# Patient Record
Sex: FEMALE | Race: WHITE | NOT HISPANIC OR LATINO | Employment: OTHER | URBAN - NONMETROPOLITAN AREA
[De-identification: names, ages, dates, MRNs, and addresses within clinical notes are randomized per-mention and may not be internally consistent; named-entity substitution may affect disease eponyms.]

---

## 2017-03-10 ENCOUNTER — TELEPHONE (OUTPATIENT)
Dept: OBSTETRICS AND GYNECOLOGY | Facility: CLINIC | Age: 67
End: 2017-03-10

## 2017-03-10 NOTE — TELEPHONE ENCOUNTER
Pt never got labs drawn at her last visit. Does she still need to come in for them or can they now be canceled?

## 2018-05-31 ENCOUNTER — TELEPHONE (OUTPATIENT)
Dept: OBSTETRICS AND GYNECOLOGY | Facility: CLINIC | Age: 68
End: 2018-05-31

## 2018-05-31 NOTE — TELEPHONE ENCOUNTER
Pt has appt next Thursday and was asking if she could get started on Premarin cream before her appt. I let her know we would need to wait until that appt so zenon can assess and discuss with her pt understood

## 2018-06-14 ENCOUNTER — OFFICE VISIT (OUTPATIENT)
Dept: OBSTETRICS AND GYNECOLOGY | Facility: CLINIC | Age: 68
End: 2018-06-14

## 2018-06-14 VITALS
WEIGHT: 186 LBS | HEIGHT: 64 IN | DIASTOLIC BLOOD PRESSURE: 80 MMHG | BODY MASS INDEX: 31.76 KG/M2 | SYSTOLIC BLOOD PRESSURE: 140 MMHG

## 2018-06-14 DIAGNOSIS — R30.0 DYSURIA: ICD-10-CM

## 2018-06-14 DIAGNOSIS — Z12.31 ENCOUNTER FOR SCREENING MAMMOGRAM FOR MALIGNANT NEOPLASM OF BREAST: ICD-10-CM

## 2018-06-14 DIAGNOSIS — Z01.419 WELL WOMAN EXAM WITH ROUTINE GYNECOLOGICAL EXAM: Primary | ICD-10-CM

## 2018-06-14 DIAGNOSIS — Z12.11 SCREENING FOR COLON CANCER: ICD-10-CM

## 2018-06-14 DIAGNOSIS — N95.1 MENOPAUSAL STATE: ICD-10-CM

## 2018-06-14 DIAGNOSIS — N89.8 VAGINAL DRYNESS: ICD-10-CM

## 2018-06-14 PROCEDURE — G0101 CA SCREEN;PELVIC/BREAST EXAM: HCPCS | Performed by: NURSE PRACTITIONER

## 2018-06-14 RX ORDER — NITROFURANTOIN 25; 75 MG/1; MG/1
100 CAPSULE ORAL 2 TIMES DAILY
Qty: 14 CAPSULE | Refills: 0 | Status: SHIPPED | OUTPATIENT
Start: 2018-06-14 | End: 2018-06-21

## 2018-06-14 RX ORDER — PHENAZOPYRIDINE HYDROCHLORIDE 200 MG/1
TABLET, FILM COATED ORAL
Qty: 9 TABLET | Refills: 1 | Status: ON HOLD | OUTPATIENT
Start: 2018-06-14 | End: 2021-04-09

## 2018-06-14 NOTE — PROGRESS NOTES
Deanna Tsai is a 68 y.o.      Chief Complaint   Patient presents with   • Gynecologic Exam     I am here for a yearly exam.  I am due for a donte.  I think I would like to go back on Premarin.             HPI - Patient is in for annual exam.    She had a MILA SADIE for fibroids.  She has no hot flashes, she used to take HRT.  Her mother had breast cancer in her 40's.  She has some dysuria and urge incontinence.  Her DAISY is mild.  She c/o vaginal dryness.  She is due for a colonoscopy.            The following portions of the patient's history were reviewed and updated as appropriate:vital signs, allergies, current medications, past family history, past medical history, past social history, past surgical history and problem list.    No current outpatient prescriptions on file.    Review of Systems   Constitutional: Negative for activity change, appetite change, fatigue and fever.   HENT: Negative for ear pain, hearing loss, sore throat and trouble swallowing.    Eyes: Negative for pain, discharge and visual disturbance.   Respiratory: Negative for apnea, cough, chest tightness and shortness of breath.    Cardiovascular: Negative for chest pain and palpitations.   Gastrointestinal: Negative for abdominal distention, abdominal pain, blood in stool, constipation, diarrhea, nausea and vomiting.   Endocrine: Negative for heat intolerance, polydipsia and polyuria.   Genitourinary: Positive for dysuria and urgency. Negative for difficulty urinating, dyspareunia, frequency, menstrual problem, pelvic pain, vaginal bleeding, vaginal discharge and vaginal pain.        Vaginal dryness     Musculoskeletal: Positive for arthralgias. Negative for back pain, joint swelling and myalgias.   Skin: Negative for rash and wound.   Allergic/Immunologic: Negative for environmental allergies and immunocompromised state.   Neurological: Negative for dizziness, tremors, seizures, numbness and headaches.   Hematological: Negative  "for adenopathy. Does not bruise/bleed easily.   Psychiatric/Behavioral: Negative for agitation, confusion and sleep disturbance. The patient is not nervous/anxious.      Breast ROS: negative    Objective      /80 (BP Location: Right arm, Patient Position: Sitting, Cuff Size: Adult)   Ht 162.6 cm (64\")   Wt 84.4 kg (186 lb)   BMI 31.93 kg/m²       Physical Exam   Constitutional: She is oriented to person, place, and time. She appears well-developed and well-nourished.   HENT:   Head: Normocephalic and atraumatic.   Right Ear: External ear normal.   Left Ear: External ear normal.   Eyes: Conjunctivae and EOM are normal. Right eye exhibits no discharge. Left eye exhibits no discharge. No scleral icterus.   Neck: Normal range of motion. Neck supple. Carotid bruit is not present. No thyromegaly present.   Cardiovascular: Regular rhythm and normal heart sounds.    No murmur heard.  Pulmonary/Chest: Effort normal and breath sounds normal. No respiratory distress. Right breast exhibits no inverted nipple, no mass, no nipple discharge, no skin change and no tenderness. Left breast exhibits no inverted nipple, no mass, no nipple discharge, no skin change and no tenderness. Breasts are symmetrical. There is no breast swelling.   Abdominal: Soft. Bowel sounds are normal. She exhibits no distension and no mass. There is no tenderness. There is no guarding. No hernia. Hernia confirmed negative in the right inguinal area and confirmed negative in the left inguinal area.   Genitourinary: Vagina normal. Rectal exam shows no mass. No breast tenderness, discharge or bleeding. There is no rash, tenderness, lesion or injury on the right labia. There is no rash, tenderness, lesion or injury on the left labia. Uterus is not deviated, not fixed and not tender. Cervix exhibits no motion tenderness, no discharge and no friability. Right adnexum displays no mass and no tenderness. Left adnexum displays no mass and no tenderness. No " erythema or bleeding in the vagina. No signs of injury around the vagina. No vaginal discharge found.   Genitourinary Comments: Cervix, Uterus and Adnexa are surgically absent.  Urethra and urethral meatus normal  Bladder, normal no prolapse  Perineum and anus examined and without lesions  Vaginal atrophy   Musculoskeletal: Normal range of motion. She exhibits no edema or tenderness.   Lymphadenopathy:        Head (right side): No submental, no submandibular, no tonsillar, no preauricular, no posterior auricular and no occipital adenopathy present.        Head (left side): No submental, no submandibular, no tonsillar, no preauricular, no posterior auricular and no occipital adenopathy present.     She has no cervical adenopathy.        Right cervical: No superficial cervical, no deep cervical and no posterior cervical adenopathy present.       Left cervical: No superficial cervical, no deep cervical and no posterior cervical adenopathy present.     She has no axillary adenopathy.        Right: No inguinal adenopathy present.        Left: No inguinal adenopathy present.   Neurological: She is alert and oriented to person, place, and time. She exhibits normal muscle tone. Coordination normal.   Skin: Skin is warm and dry. No bruising and no rash noted. No erythema.   Psychiatric: She has a normal mood and affect. Her behavior is normal. Judgment and thought content normal.   Nursing note and vitals reviewed.       Assessment/Plan   Diagnoses and all orders for this visit:    Well woman exam with routine gynecological exam    Encounter for screening mammogram for malignant neoplasm of breast  -     Mammo Screening Digital Tomosynthesis Bilateral With CAD; Future    Menopausal state  -     DEXA Bone Density Axial; Future    Screening for colon cancer  -     Ambulatory Referral to Gastroenterology    Vaginal dryness  -     Discontinue: conjugated estrogens (PREMARIN) vaginal cream 1 application; Insert 1 application into  the vagina Once per day on Mon Thu.  -     conjugated estrogens (PREMARIN) 0.625 MG/GM vaginal cream; Insert  into the vagina 2 (Two) Times a Week.    Dysuria  -     nitrofurantoin, macrocrystal-monohydrate, (MACROBID) 100 MG capsule; Take 1 capsule by mouth 2 (Two) Times a Day for 7 days.  -     phenazopyridine (PYRIDIUM) 200 MG tablet; 1 po tid x 3 days    Mother had breast cancer in her 40's.  Patient is counseled and given info re: BRACA.    Patient is counseled re: BSE, diet, exercise, mammogram, calcium and Vit. D3      Rachelle Lloyd, CRISTHIAN  6/14/2018

## 2018-06-14 NOTE — PROGRESS NOTES
Attempted to obtain health maintenance information, patient unable to provide answers for the following items Tdap, Pneumococcal Vaccine and Zoster Vaccine.

## 2018-06-29 ENCOUNTER — HOSPITAL ENCOUNTER (OUTPATIENT)
Dept: BONE DENSITY | Facility: HOSPITAL | Age: 68
Discharge: HOME | End: 2018-06-29

## 2018-06-29 ENCOUNTER — HOSPITAL ENCOUNTER (OUTPATIENT)
Dept: MAMMOGRAPHY | Facility: HOSPITAL | Age: 68
Discharge: HOME | End: 2018-06-29
Admitting: NURSE PRACTITIONER

## 2018-06-29 DIAGNOSIS — Z12.31 ENCOUNTER FOR SCREENING MAMMOGRAM FOR MALIGNANT NEOPLASM OF BREAST: ICD-10-CM

## 2018-06-29 DIAGNOSIS — N95.1 MENOPAUSAL STATE: ICD-10-CM

## 2018-06-29 PROCEDURE — 77063 BREAST TOMOSYNTHESIS BI: CPT

## 2018-06-29 PROCEDURE — 77067 SCR MAMMO BI INCL CAD: CPT

## 2018-06-29 PROCEDURE — 77080 DXA BONE DENSITY AXIAL: CPT

## 2018-07-02 ENCOUNTER — TELEPHONE (OUTPATIENT)
Dept: OBSTETRICS AND GYNECOLOGY | Facility: CLINIC | Age: 68
End: 2018-07-02

## 2018-07-02 DIAGNOSIS — B37.31 YEAST VAGINITIS: Primary | ICD-10-CM

## 2018-07-02 RX ORDER — FLUCONAZOLE 100 MG/1
TABLET ORAL
Qty: 1 TABLET | Refills: 1 | Status: SHIPPED | OUTPATIENT
Start: 2018-07-02 | End: 2021-03-01

## 2018-11-12 ENCOUNTER — OFFICE VISIT (OUTPATIENT)
Dept: GASTROENTEROLOGY | Facility: CLINIC | Age: 68
End: 2018-11-12

## 2018-11-12 VITALS
WEIGHT: 192 LBS | HEIGHT: 64 IN | TEMPERATURE: 97.5 F | OXYGEN SATURATION: 98 % | BODY MASS INDEX: 32.78 KG/M2 | DIASTOLIC BLOOD PRESSURE: 90 MMHG | HEART RATE: 75 BPM | SYSTOLIC BLOOD PRESSURE: 140 MMHG

## 2018-11-12 DIAGNOSIS — Z86.010 HX OF COLONIC POLYP: Primary | ICD-10-CM

## 2018-11-12 DIAGNOSIS — Z83.71 FAMILY HX COLONIC POLYPS: ICD-10-CM

## 2018-11-12 DIAGNOSIS — K21.9 GASTROESOPHAGEAL REFLUX DISEASE, ESOPHAGITIS PRESENCE NOT SPECIFIED: ICD-10-CM

## 2018-11-12 PROCEDURE — 99202 OFFICE O/P NEW SF 15 MIN: CPT | Performed by: NURSE PRACTITIONER

## 2018-11-12 RX ORDER — POLYETHYLENE GLYCOL 3350, SODIUM CHLORIDE, SODIUM BICARBONATE, POTASSIUM CHLORIDE 420; 11.2; 5.72; 1.48 G/4L; G/4L; G/4L; G/4L
4000 POWDER, FOR SOLUTION ORAL SEE ADMIN INSTRUCTIONS
Qty: 4000 ML | Refills: 0 | Status: SHIPPED | OUTPATIENT
Start: 2018-11-12 | End: 2021-03-01

## 2018-11-12 NOTE — PROGRESS NOTES
[facility] Gastroenterology    Primary Physician Provider, No Known    11/12/2018    Deanna Tsai   1950      Chief Complaint   Patient presents with   • Colonoscopy   heartburn/jregurgitation.     Subjective     HPI    Deanna Tsai is a 68 y.o. female who presents as a referral for preventative maintenance. She has no complaints of nausea or vomiting. No change in bowels. No wt loss. No BRBPR. No melena. No abdominal pain.     Heartburn  X several years,  Takes otc pepcid prn that helps.  Has used prevacid prn Has heartburn depending on foods eaten such as caffeine, alcohol, and overeating. Also has occasional regurgitation. Does tend to eat late. No dysphagia. No n/v.     Last colonoscopy was 8/2005, small polyp, path hyperplastic. The patient does have history of colon polyps. The patient does not have history of colon cancer.   There is family history of colon polyps father in his 60's, brother unsure of age . There is no family history of colon cancer.     No h/o egd.         Past Medical History:   Diagnosis Date   • Colon polyp        Past Surgical History:   Procedure Laterality Date   • COLONOSCOPY  08/31/2005    hyperplastic polyp, enlarged ileocecal valve   • HYSTERECTOMY      MILA BSO for fibroids   • TONSILLECTOMY     • WISDOM TOOTH EXTRACTION         Outpatient Medications Marked as Taking for the 11/12/18 encounter (Office Visit) with Letty Brian APRN   Medication Sig Dispense Refill   • Multiple Vitamins-Minerals (PRESERVISION AREDS PO) Take  by mouth 2 (Two) Times a Day.     • phenazopyridine (PYRIDIUM) 200 MG tablet 1 po tid x 3 days (Patient taking differently: As Needed. 1 po tid x 3 days) 9 tablet 1       No Known Allergies    Social History     Socioeconomic History   • Marital status:      Spouse name: Not on file   • Number of children: Not on file   • Years of education: Not on file   • Highest education level: Not on file   Social Needs   • Financial resource  strain: Not on file   • Food insecurity - worry: Not on file   • Food insecurity - inability: Not on file   • Transportation needs - medical: Not on file   • Transportation needs - non-medical: Not on file   Occupational History   • Not on file   Tobacco Use   • Smoking status: Never Smoker   • Smokeless tobacco: Never Used   Substance and Sexual Activity   • Alcohol use: Yes     Comment: Occasional   • Drug use: No   • Sexual activity: No     Birth control/protection: Post-menopausal   Other Topics Concern   • Not on file   Social History Narrative   • Not on file       Family History   Problem Relation Age of Onset   • Colon polyps Father    • Breast cancer Mother    • Ovarian cancer Neg Hx    • Colon cancer Neg Hx    • Uterine cancer Neg Hx        Review of Systems   Constitutional: Negative for appetite change, chills, fatigue, fever and unexpected weight change.   HENT: Negative for sore throat and trouble swallowing.    Eyes: Negative for visual disturbance.   Respiratory: Negative for cough, chest tightness, shortness of breath and wheezing.    Cardiovascular: Negative for chest pain and palpitations.   Gastrointestinal: Negative for abdominal distention, abdominal pain, anal bleeding, blood in stool, constipation, diarrhea, nausea, rectal pain and vomiting.        As mentioned in hpi   Genitourinary: Negative for difficulty urinating and hematuria.   Musculoskeletal: Negative for arthralgias and back pain.   Skin: Negative for color change and rash.   Neurological: Positive for dizziness (occasional ). Negative for seizures, syncope, light-headedness and headaches.   Hematological: Negative for adenopathy.   Psychiatric/Behavioral: Negative for confusion. The patient is not nervous/anxious.        Objective     Vitals:    11/12/18 1323   BP: 140/90   Pulse: 75   Temp: 97.5 °F (36.4 °C)   SpO2: 98%         11/12/18  1323   Weight: 87.1 kg (192 lb)     Body mass index is 32.96 kg/m².    Physical Exam    Constitutional: She appears well-developed and well-nourished. No distress.   HENT:   Head: Normocephalic and atraumatic.   Eyes: EOM are normal. No scleral icterus.   Neck: Neck supple. No JVD present.   Cardiovascular: Normal rate, regular rhythm and normal heart sounds.   Pulmonary/Chest: Effort normal and breath sounds normal. No stridor.   Abdominal: Soft. Bowel sounds are normal. She exhibits no distension and no mass. There is no tenderness. There is no rebound and no guarding.   Musculoskeletal: Normal range of motion. She exhibits no deformity.   Neurological: She is alert.   Skin: Skin is warm and dry. No rash noted.   Psychiatric: She has a normal mood and affect. Her behavior is normal.   Vitals reviewed.      Imaging Results (most recent)     None          Assessment/Plan     Deanna was seen today for colonoscopy.    Diagnoses and all orders for this visit:    Hx of colonic polyp  -     Case Request; Standing  -     Case Request    Family hx colonic polyps  -     Case Request; Standing  -     Case Request    Gastroesophageal reflux disease, esophagitis presence not specified  -     Case Request; Standing  -     Case Request    Other orders  -     Follow Anesthesia Guidelines / Standing Orders; Future  -     Implement Anesthesia Orders Day of Procedure; Standing  -     Obtain Informed Consent; Standing  -     polyethylene glycol-electrolytes (NULYTELY WITH FLAVOR PACKS) 420 g solution; Take 4,000 mL by mouth See Admin Instructions.    Plan for colonoscopy.     Recommend strict anti reflux precautions. Plan for EGD.            Body mass index is 32.96 kg/m².    Patient's Body mass index is 32.96 kg/m². BMI is above normal parameters. Recommendations include: no follow-up required. Recommend weight loss.       ESOPHAGOGASTRODUODENOSCOPY WITH ANESTHESIA (N/A), COLONOSCOPY WITH ANESTHESIA (N/A)  All risks, benefits, alternatives, and indications of colonoscopy procedure have been discussed with the patient.  Risks to include perforation of the colon requiring possible surgery or colostomy, risk of bleeding from biopsies or removal of colon tissue, possibility of missing a colon polyp or cancer, or adverse drug reaction.  Benefits to include the diagnosis and management of disease of the colon and rectum. Alternatives to include barium enema, radiographic evaluation, lab testing or no intervention. Pt verbalizes understanding and agrees.     Risk, benefits, and alternatives of endoscopy were explained in full.  They understand that there is a risk of bleeding, perforation, and infection.  The risk of perforation goes up with esophageal dilation.  Other options to evaluate UGI complaints could involve barium swallow or UGI series, but these would be diagnostic tests only.  Patient was given time to ask questions.  I answered them to their satisfaction and they are agreeable to proceeding        CRISTHIAN Matos      EMR Dragon/transcription disclaimer:  Much of this encounter note is electronic transcription/translation of spoken language to printed text.  The electronic translation of spoken language may be erroneous, or at times, nonsensical words or phrases may be inadvertently transcribed.  Although I have reviewed the note for such errors, some may still exist.

## 2018-11-13 PROBLEM — Z83.719 FAMILY HX COLONIC POLYPS: Status: ACTIVE | Noted: 2018-11-13

## 2018-11-13 PROBLEM — Z86.010 HX OF COLONIC POLYP: Status: ACTIVE | Noted: 2018-11-13

## 2018-11-13 PROBLEM — K21.9 GASTROESOPHAGEAL REFLUX DISEASE: Status: ACTIVE | Noted: 2018-11-13

## 2018-11-13 PROBLEM — Z86.0100 HX OF COLONIC POLYP: Status: ACTIVE | Noted: 2018-11-13

## 2018-11-13 PROBLEM — Z83.71 FAMILY HX COLONIC POLYPS: Status: ACTIVE | Noted: 2018-11-13

## 2018-12-06 ENCOUNTER — ANESTHESIA (OUTPATIENT)
Dept: GASTROENTEROLOGY | Facility: HOSPITAL | Age: 68
End: 2018-12-06

## 2018-12-06 ENCOUNTER — ANESTHESIA EVENT (OUTPATIENT)
Dept: GASTROENTEROLOGY | Facility: HOSPITAL | Age: 68
End: 2018-12-06

## 2018-12-06 ENCOUNTER — HOSPITAL ENCOUNTER (OUTPATIENT)
Facility: HOSPITAL | Age: 68
Setting detail: HOSPITAL OUTPATIENT SURGERY
Discharge: HOME | End: 2018-12-06
Attending: INTERNAL MEDICINE | Admitting: INTERNAL MEDICINE

## 2018-12-06 VITALS
OXYGEN SATURATION: 100 % | WEIGHT: 189 LBS | TEMPERATURE: 97.3 F | SYSTOLIC BLOOD PRESSURE: 107 MMHG | RESPIRATION RATE: 17 BRPM | HEIGHT: 64 IN | DIASTOLIC BLOOD PRESSURE: 69 MMHG | BODY MASS INDEX: 32.27 KG/M2 | HEART RATE: 58 BPM

## 2018-12-06 DIAGNOSIS — Z86.010 HX OF COLONIC POLYP: ICD-10-CM

## 2018-12-06 DIAGNOSIS — Z83.71 FAMILY HX COLONIC POLYPS: ICD-10-CM

## 2018-12-06 DIAGNOSIS — K21.9 GASTROESOPHAGEAL REFLUX DISEASE, ESOPHAGITIS PRESENCE NOT SPECIFIED: ICD-10-CM

## 2018-12-06 PROCEDURE — 88305 TISSUE EXAM BY PATHOLOGIST: CPT | Performed by: INTERNAL MEDICINE

## 2018-12-06 PROCEDURE — 45385 COLONOSCOPY W/LESION REMOVAL: CPT | Performed by: INTERNAL MEDICINE

## 2018-12-06 PROCEDURE — 43239 EGD BIOPSY SINGLE/MULTIPLE: CPT | Performed by: INTERNAL MEDICINE

## 2018-12-06 PROCEDURE — 25010000002 PROPOFOL 10 MG/ML EMULSION: Performed by: NURSE ANESTHETIST, CERTIFIED REGISTERED

## 2018-12-06 RX ORDER — LIDOCAINE HYDROCHLORIDE 20 MG/ML
INJECTION, SOLUTION INFILTRATION; PERINEURAL AS NEEDED
Status: DISCONTINUED | OUTPATIENT
Start: 2018-12-06 | End: 2018-12-06 | Stop reason: SURG

## 2018-12-06 RX ORDER — SODIUM CHLORIDE 0.9 % (FLUSH) 0.9 %
3 SYRINGE (ML) INJECTION AS NEEDED
Status: DISCONTINUED | OUTPATIENT
Start: 2018-12-06 | End: 2018-12-06 | Stop reason: HOSPADM

## 2018-12-06 RX ORDER — PROPOFOL 10 MG/ML
VIAL (ML) INTRAVENOUS AS NEEDED
Status: DISCONTINUED | OUTPATIENT
Start: 2018-12-06 | End: 2018-12-06 | Stop reason: SURG

## 2018-12-06 RX ORDER — SODIUM CHLORIDE 9 MG/ML
500 INJECTION, SOLUTION INTRAVENOUS CONTINUOUS PRN
Status: DISCONTINUED | OUTPATIENT
Start: 2018-12-06 | End: 2018-12-06 | Stop reason: HOSPADM

## 2018-12-06 RX ORDER — SODIUM CHLORIDE 0.9 % (FLUSH) 0.9 %
3-10 SYRINGE (ML) INJECTION AS NEEDED
Status: CANCELLED | OUTPATIENT
Start: 2018-12-06

## 2018-12-06 RX ORDER — SODIUM CHLORIDE 9 MG/ML
100 INJECTION, SOLUTION INTRAVENOUS CONTINUOUS
Status: CANCELLED | OUTPATIENT
Start: 2018-12-06

## 2018-12-06 RX ORDER — ONDANSETRON 2 MG/ML
4 INJECTION INTRAMUSCULAR; INTRAVENOUS ONCE AS NEEDED
Status: DISCONTINUED | OUTPATIENT
Start: 2018-12-06 | End: 2018-12-06 | Stop reason: HOSPADM

## 2018-12-06 RX ORDER — SODIUM CHLORIDE 0.9 % (FLUSH) 0.9 %
3 SYRINGE (ML) INJECTION EVERY 12 HOURS SCHEDULED
Status: CANCELLED | OUTPATIENT
Start: 2018-12-06

## 2018-12-06 RX ADMIN — PROPOFOL 20 MG: 10 INJECTION, EMULSION INTRAVENOUS at 08:57

## 2018-12-06 RX ADMIN — PROPOFOL 20 MG: 10 INJECTION, EMULSION INTRAVENOUS at 09:22

## 2018-12-06 RX ADMIN — PROPOFOL 20 MG: 10 INJECTION, EMULSION INTRAVENOUS at 08:56

## 2018-12-06 RX ADMIN — PROPOFOL 20 MG: 10 INJECTION, EMULSION INTRAVENOUS at 09:14

## 2018-12-06 RX ADMIN — PROPOFOL 20 MG: 10 INJECTION, EMULSION INTRAVENOUS at 09:17

## 2018-12-06 RX ADMIN — PROPOFOL 20 MG: 10 INJECTION, EMULSION INTRAVENOUS at 09:06

## 2018-12-06 RX ADMIN — PROPOFOL 50 MG: 10 INJECTION, EMULSION INTRAVENOUS at 08:53

## 2018-12-06 RX ADMIN — PROPOFOL 20 MG: 10 INJECTION, EMULSION INTRAVENOUS at 09:02

## 2018-12-06 RX ADMIN — PROPOFOL 20 MG: 10 INJECTION, EMULSION INTRAVENOUS at 08:59

## 2018-12-06 RX ADMIN — LIDOCAINE HYDROCHLORIDE 100 MG: 20 INJECTION, SOLUTION INFILTRATION; PERINEURAL at 08:49

## 2018-12-06 RX ADMIN — PROPOFOL 20 MG: 10 INJECTION, EMULSION INTRAVENOUS at 09:04

## 2018-12-06 RX ADMIN — PROPOFOL 20 MG: 10 INJECTION, EMULSION INTRAVENOUS at 09:00

## 2018-12-06 RX ADMIN — LIDOCAINE HYDROCHLORIDE 0.5 ML: 10 INJECTION, SOLUTION EPIDURAL; INFILTRATION; INTRACAUDAL; PERINEURAL at 07:57

## 2018-12-06 RX ADMIN — PROPOFOL 20 MG: 10 INJECTION, EMULSION INTRAVENOUS at 09:16

## 2018-12-06 RX ADMIN — PROPOFOL 20 MG: 10 INJECTION, EMULSION INTRAVENOUS at 09:05

## 2018-12-06 RX ADMIN — PROPOFOL 20 MG: 10 INJECTION, EMULSION INTRAVENOUS at 09:19

## 2018-12-06 RX ADMIN — PROPOFOL 20 MG: 10 INJECTION, EMULSION INTRAVENOUS at 09:10

## 2018-12-06 RX ADMIN — PROPOFOL 100 MG: 10 INJECTION, EMULSION INTRAVENOUS at 08:50

## 2018-12-06 RX ADMIN — SODIUM CHLORIDE 500 ML: 9 INJECTION, SOLUTION INTRAVENOUS at 07:57

## 2018-12-06 RX ADMIN — PROPOFOL 20 MG: 10 INJECTION, EMULSION INTRAVENOUS at 09:21

## 2018-12-06 RX ADMIN — PROPOFOL 50 MG: 10 INJECTION, EMULSION INTRAVENOUS at 08:55

## 2018-12-06 NOTE — ANESTHESIA POSTPROCEDURE EVALUATION
"Patient: Deanna Tsai    Procedure Summary     Date:  12/06/18 Room / Location:  [facility] ENDOSCOPY 4 / [facility]    Anesthesia Start:  0843 Anesthesia Stop:  0925    Procedures:       ESOPHAGOGASTRODUODENOSCOPY WITH ANESTHESIA (N/A )      COLONOSCOPY WITH ANESTHESIA (N/A ) Diagnosis:       Hx of colonic polyp      Family hx colonic polyps      Gastroesophageal reflux disease, esophagitis presence not specified      (Hx of colonic polyp [Z86.010])      (Family hx colonic polyps [Z83.71])      (Gastroesophageal reflux disease, esophagitis presence not specified [K21.9])    Surgeon:  Kwame Layne MD Provider:  Shaina Andrade CRNA    Anesthesia Type:  general ASA Status:  1          Anesthesia Type: general  Last vitals  BP   161/87 (12/06/18 0744)   Temp   97.3 °F (36.3 °C) (12/06/18 0744)   Pulse   72 (12/06/18 0744)   Resp   18 (12/06/18 0744)     SpO2   98 % (12/06/18 0744)     Post Anesthesia Care and Evaluation    Patient location during evaluation: PHASE II  Patient participation: complete - patient participated  Level of consciousness: awake and alert  Pain management: adequate  Airway patency: patent  Anesthetic complications: No anesthetic complications    Cardiovascular status: acceptable  Respiratory status: acceptable  Hydration status: acceptable    Comments: Blood pressure 161/87, pulse 72, temperature 97.3 °F (36.3 °C), temperature source Temporal, resp. rate 18, height 162.6 cm (64\"), weight 85.7 kg (189 lb), SpO2 98 %.    Pt discharged from PACU based on sarah score >8      "

## 2018-12-06 NOTE — ANESTHESIA PREPROCEDURE EVALUATION
Anesthesia Evaluation     no history of anesthetic complications:  NPO Solid Status: > 8 hours  NPO Liquid Status: > 2 hours           Airway   Mallampati: II  TM distance: >3 FB  Neck ROM: full  Dental - normal exam     Pulmonary - normal exam    breath sounds clear to auscultation  (-) asthma, recent URI, sleep apnea, not a smoker  Cardiovascular - normal exam  Exercise tolerance: good (4-7 METS)    Rhythm: regular  Rate: normal    (-) pacemaker, hypertension, past MI, angina, cardiac stents, CABG      Neuro/Psych  (-) seizures, TIA, CVA  GI/Hepatic/Renal/Endo    (+)  GERD well controlled,    (-) liver disease, no renal disease, diabetes, hypothyroidism, hyperthyroidism    Musculoskeletal     Abdominal    Substance History      OB/GYN          Other                        Anesthesia Plan    ASA 1     general   total IV anesthesia  intravenous induction   Anesthetic plan, all risks, benefits, and alternatives have been provided, discussed and informed consent has been obtained with: patient.

## 2018-12-11 LAB
CYTO UR: NORMAL
LAB AP CASE REPORT: NORMAL
PATH REPORT.FINAL DX SPEC: NORMAL
PATH REPORT.GROSS SPEC: NORMAL

## 2019-01-28 ENCOUNTER — OFFICE VISIT (OUTPATIENT)
Dept: INTERNAL MEDICINE | Age: 69
End: 2019-01-28
Payer: MEDICARE

## 2019-01-28 VITALS
SYSTOLIC BLOOD PRESSURE: 128 MMHG | BODY MASS INDEX: 32.95 KG/M2 | HEART RATE: 79 BPM | WEIGHT: 193 LBS | DIASTOLIC BLOOD PRESSURE: 82 MMHG | HEIGHT: 64 IN | OXYGEN SATURATION: 98 % | RESPIRATION RATE: 18 BRPM

## 2019-01-28 DIAGNOSIS — Z11.59 NEED FOR HEPATITIS C SCREENING TEST: ICD-10-CM

## 2019-01-28 DIAGNOSIS — E66.09 EXOGENOUS OBESITY: ICD-10-CM

## 2019-01-28 DIAGNOSIS — Z23 NEED FOR VACCINATION: ICD-10-CM

## 2019-01-28 DIAGNOSIS — Z00.00 ENCOUNTER FOR INITIAL PREVENTIVE PHYSICAL EXAMINATION COVERED BY MEDICARE: ICD-10-CM

## 2019-01-28 DIAGNOSIS — K44.9 HIATAL HERNIA: ICD-10-CM

## 2019-01-28 DIAGNOSIS — Z80.3 FAMILY HISTORY OF BREAST CANCER: Primary | ICD-10-CM

## 2019-01-28 DIAGNOSIS — K21.9 GASTROESOPHAGEAL REFLUX DISEASE WITHOUT ESOPHAGITIS: ICD-10-CM

## 2019-01-28 DIAGNOSIS — R53.83 OTHER FATIGUE: ICD-10-CM

## 2019-01-28 PROCEDURE — G8417 CALC BMI ABV UP PARAM F/U: HCPCS | Performed by: INTERNAL MEDICINE

## 2019-01-28 PROCEDURE — 1090F PRES/ABSN URINE INCON ASSESS: CPT | Performed by: INTERNAL MEDICINE

## 2019-01-28 PROCEDURE — 1123F ACP DISCUSS/DSCN MKR DOCD: CPT | Performed by: INTERNAL MEDICINE

## 2019-01-28 PROCEDURE — G0009 ADMIN PNEUMOCOCCAL VACCINE: HCPCS | Performed by: INTERNAL MEDICINE

## 2019-01-28 PROCEDURE — 1036F TOBACCO NON-USER: CPT | Performed by: INTERNAL MEDICINE

## 2019-01-28 PROCEDURE — 1101F PT FALLS ASSESS-DOCD LE1/YR: CPT | Performed by: INTERNAL MEDICINE

## 2019-01-28 PROCEDURE — 4040F PNEUMOC VAC/ADMIN/RCVD: CPT | Performed by: INTERNAL MEDICINE

## 2019-01-28 PROCEDURE — 3017F COLORECTAL CA SCREEN DOC REV: CPT | Performed by: INTERNAL MEDICINE

## 2019-01-28 PROCEDURE — G8427 DOCREV CUR MEDS BY ELIG CLIN: HCPCS | Performed by: INTERNAL MEDICINE

## 2019-01-28 PROCEDURE — G8400 PT W/DXA NO RESULTS DOC: HCPCS | Performed by: INTERNAL MEDICINE

## 2019-01-28 PROCEDURE — 90670 PCV13 VACCINE IM: CPT | Performed by: INTERNAL MEDICINE

## 2019-01-28 PROCEDURE — 99204 OFFICE O/P NEW MOD 45 MIN: CPT | Performed by: INTERNAL MEDICINE

## 2019-01-28 PROCEDURE — G8482 FLU IMMUNIZE ORDER/ADMIN: HCPCS | Performed by: INTERNAL MEDICINE

## 2019-01-28 RX ORDER — FAMOTIDINE 10 MG
10 TABLET ORAL 2 TIMES DAILY PRN
COMMUNITY

## 2019-01-28 ASSESSMENT — PATIENT HEALTH QUESTIONNAIRE - PHQ9
SUM OF ALL RESPONSES TO PHQ QUESTIONS 1-9: 0
2. FEELING DOWN, DEPRESSED OR HOPELESS: 0
SUM OF ALL RESPONSES TO PHQ QUESTIONS 1-9: 0
1. LITTLE INTEREST OR PLEASURE IN DOING THINGS: 0
SUM OF ALL RESPONSES TO PHQ9 QUESTIONS 1 & 2: 0

## 2019-01-28 ASSESSMENT — ENCOUNTER SYMPTOMS
CONSTIPATION: 0
NAUSEA: 0
SINUS PRESSURE: 0
VOICE CHANGE: 0
BLOOD IN STOOL: 0
COLOR CHANGE: 0
WHEEZING: 0
TROUBLE SWALLOWING: 0
SORE THROAT: 0
COUGH: 0
DIARRHEA: 0
VOMITING: 0
ABDOMINAL PAIN: 0
CHEST TIGHTNESS: 0
EYE PAIN: 0
EYE REDNESS: 0

## 2019-01-31 DIAGNOSIS — R73.01 IFG (IMPAIRED FASTING GLUCOSE): Primary | ICD-10-CM

## 2019-01-31 DIAGNOSIS — Z00.00 ENCOUNTER FOR INITIAL PREVENTIVE PHYSICAL EXAMINATION COVERED BY MEDICARE: ICD-10-CM

## 2019-01-31 DIAGNOSIS — K44.9 HIATAL HERNIA: ICD-10-CM

## 2019-01-31 DIAGNOSIS — E66.09 EXOGENOUS OBESITY: ICD-10-CM

## 2019-01-31 DIAGNOSIS — Z11.59 NEED FOR HEPATITIS C SCREENING TEST: ICD-10-CM

## 2019-01-31 DIAGNOSIS — R73.01 IFG (IMPAIRED FASTING GLUCOSE): ICD-10-CM

## 2019-01-31 DIAGNOSIS — Z23 NEED FOR VACCINATION: ICD-10-CM

## 2019-01-31 DIAGNOSIS — E78.00 PURE HYPERCHOLESTEROLEMIA: ICD-10-CM

## 2019-01-31 DIAGNOSIS — K21.9 GASTROESOPHAGEAL REFLUX DISEASE WITHOUT ESOPHAGITIS: ICD-10-CM

## 2019-01-31 DIAGNOSIS — Z80.3 FAMILY HISTORY OF BREAST CANCER: ICD-10-CM

## 2019-01-31 DIAGNOSIS — R53.83 OTHER FATIGUE: ICD-10-CM

## 2019-01-31 LAB
ALBUMIN SERPL-MCNC: 4.2 G/DL (ref 3.5–5.2)
ALP BLD-CCNC: 70 U/L (ref 35–104)
ALT SERPL-CCNC: 13 U/L (ref 5–33)
ANION GAP SERPL CALCULATED.3IONS-SCNC: 12 MMOL/L (ref 7–19)
AST SERPL-CCNC: 12 U/L (ref 5–32)
BASOPHILS ABSOLUTE: 0 K/UL (ref 0–0.2)
BASOPHILS RELATIVE PERCENT: 0.8 % (ref 0–1)
BILIRUB SERPL-MCNC: 0.8 MG/DL (ref 0.2–1.2)
BILIRUBIN URINE: NEGATIVE
BLOOD, URINE: NEGATIVE
BUN BLDV-MCNC: 16 MG/DL (ref 8–23)
CALCIUM SERPL-MCNC: 9 MG/DL (ref 8.8–10.2)
CHLORIDE BLD-SCNC: 104 MMOL/L (ref 98–111)
CHOLESTEROL, TOTAL: 226 MG/DL (ref 160–199)
CLARITY: CLEAR
CO2: 26 MMOL/L (ref 22–29)
COLOR: YELLOW
CREAT SERPL-MCNC: 0.7 MG/DL (ref 0.5–0.9)
EOSINOPHILS ABSOLUTE: 0.2 K/UL (ref 0–0.6)
EOSINOPHILS RELATIVE PERCENT: 4.4 % (ref 0–5)
GFR NON-AFRICAN AMERICAN: >60
GLUCOSE BLD-MCNC: 103 MG/DL (ref 74–109)
GLUCOSE URINE: NEGATIVE MG/DL
HBA1C MFR BLD: 5.6 % (ref 4–6)
HCT VFR BLD CALC: 41.1 % (ref 37–47)
HDLC SERPL-MCNC: 91 MG/DL (ref 65–121)
HEMOGLOBIN: 12.9 G/DL (ref 12–16)
HEPATITIS C ANTIBODY INTERPRETATION: NORMAL
KETONES, URINE: NEGATIVE MG/DL
LDL CHOLESTEROL CALCULATED: 116 MG/DL
LEUKOCYTE ESTERASE, URINE: NEGATIVE
LYMPHOCYTES ABSOLUTE: 1.5 K/UL (ref 1.1–4.5)
LYMPHOCYTES RELATIVE PERCENT: 30.9 % (ref 20–40)
MCH RBC QN AUTO: 30.4 PG (ref 27–31)
MCHC RBC AUTO-ENTMCNC: 31.4 G/DL (ref 33–37)
MCV RBC AUTO: 96.9 FL (ref 81–99)
MONOCYTES ABSOLUTE: 0.5 K/UL (ref 0–0.9)
MONOCYTES RELATIVE PERCENT: 10 % (ref 0–10)
NEUTROPHILS ABSOLUTE: 2.6 K/UL (ref 1.5–7.5)
NEUTROPHILS RELATIVE PERCENT: 53.5 % (ref 50–65)
NITRITE, URINE: NEGATIVE
PDW BLD-RTO: 12.8 % (ref 11.5–14.5)
PH UA: 6.5
PLATELET # BLD: 230 K/UL (ref 130–400)
PMV BLD AUTO: 10 FL (ref 9.4–12.3)
POTASSIUM SERPL-SCNC: 4.8 MMOL/L (ref 3.5–5)
PROTEIN UA: NEGATIVE MG/DL
RBC # BLD: 4.24 M/UL (ref 4.2–5.4)
SODIUM BLD-SCNC: 142 MMOL/L (ref 136–145)
SPECIFIC GRAVITY UA: 1.02
TOTAL PROTEIN: 6.8 G/DL (ref 6.6–8.7)
TRIGL SERPL-MCNC: 93 MG/DL (ref 0–149)
TSH SERPL DL<=0.05 MIU/L-ACNC: 4.15 UIU/ML (ref 0.27–4.2)
UROBILINOGEN, URINE: 0.2 E.U./DL
WBC # BLD: 4.8 K/UL (ref 4.8–10.8)

## 2019-02-11 ENCOUNTER — OFFICE VISIT (OUTPATIENT)
Dept: SURGERY | Age: 69
End: 2019-02-11
Payer: MEDICARE

## 2019-02-11 VITALS
DIASTOLIC BLOOD PRESSURE: 68 MMHG | HEIGHT: 64 IN | SYSTOLIC BLOOD PRESSURE: 128 MMHG | BODY MASS INDEX: 33.29 KG/M2 | HEART RATE: 65 BPM | WEIGHT: 195 LBS

## 2019-02-11 DIAGNOSIS — Z71.83 ENCOUNTER FOR NONPROCREATIVE GENETIC COUNSELING: Primary | ICD-10-CM

## 2019-02-11 DIAGNOSIS — R73.01 IFG (IMPAIRED FASTING GLUCOSE): ICD-10-CM

## 2019-02-11 DIAGNOSIS — E66.09 EXOGENOUS OBESITY: ICD-10-CM

## 2019-02-11 DIAGNOSIS — E78.00 PURE HYPERCHOLESTEROLEMIA: ICD-10-CM

## 2019-02-11 PROCEDURE — 1123F ACP DISCUSS/DSCN MKR DOCD: CPT | Performed by: PHYSICIAN ASSISTANT

## 2019-02-11 PROCEDURE — 1036F TOBACCO NON-USER: CPT | Performed by: PHYSICIAN ASSISTANT

## 2019-02-11 PROCEDURE — 1101F PT FALLS ASSESS-DOCD LE1/YR: CPT | Performed by: PHYSICIAN ASSISTANT

## 2019-02-11 PROCEDURE — 4040F PNEUMOC VAC/ADMIN/RCVD: CPT | Performed by: PHYSICIAN ASSISTANT

## 2019-02-11 PROCEDURE — G8417 CALC BMI ABV UP PARAM F/U: HCPCS | Performed by: PHYSICIAN ASSISTANT

## 2019-02-11 PROCEDURE — 99213 OFFICE O/P EST LOW 20 MIN: CPT | Performed by: PHYSICIAN ASSISTANT

## 2019-02-11 PROCEDURE — 1090F PRES/ABSN URINE INCON ASSESS: CPT | Performed by: PHYSICIAN ASSISTANT

## 2019-02-11 PROCEDURE — G8482 FLU IMMUNIZE ORDER/ADMIN: HCPCS | Performed by: PHYSICIAN ASSISTANT

## 2019-02-11 PROCEDURE — 3017F COLORECTAL CA SCREEN DOC REV: CPT | Performed by: PHYSICIAN ASSISTANT

## 2019-02-11 PROCEDURE — G8400 PT W/DXA NO RESULTS DOC: HCPCS | Performed by: PHYSICIAN ASSISTANT

## 2019-02-11 PROCEDURE — G8427 DOCREV CUR MEDS BY ELIG CLIN: HCPCS | Performed by: PHYSICIAN ASSISTANT

## 2019-02-20 ENCOUNTER — TELEPHONE (OUTPATIENT)
Dept: SURGERY | Age: 69
End: 2019-02-20

## 2019-02-20 DIAGNOSIS — Z12.39 SCREENING BREAST EXAMINATION: Primary | ICD-10-CM

## 2019-02-20 NOTE — TELEPHONE ENCOUNTER
Gave results to pt. We will continue to follow pt as high risk given her family history. Please schedule mammogram and follow up visit in June.

## 2019-07-23 ENCOUNTER — HOSPITAL ENCOUNTER (OUTPATIENT)
Dept: WOMENS IMAGING | Age: 69
Discharge: HOME | End: 2019-07-23
Payer: MEDICARE

## 2019-07-23 ENCOUNTER — OFFICE VISIT (OUTPATIENT)
Dept: SURGERY | Age: 69
End: 2019-07-23
Payer: MEDICARE

## 2019-07-23 VITALS — WEIGHT: 187 LBS | TEMPERATURE: 98.6 F | HEIGHT: 64 IN | BODY MASS INDEX: 31.92 KG/M2

## 2019-07-23 DIAGNOSIS — Z12.39 SCREENING BREAST EXAMINATION: ICD-10-CM

## 2019-07-23 DIAGNOSIS — Z12.39 SCREENING BREAST EXAMINATION: Primary | ICD-10-CM

## 2019-07-23 PROCEDURE — 1036F TOBACCO NON-USER: CPT | Performed by: PHYSICIAN ASSISTANT

## 2019-07-23 PROCEDURE — 77063 BREAST TOMOSYNTHESIS BI: CPT

## 2019-07-23 PROCEDURE — 1090F PRES/ABSN URINE INCON ASSESS: CPT | Performed by: PHYSICIAN ASSISTANT

## 2019-07-23 PROCEDURE — 99213 OFFICE O/P EST LOW 20 MIN: CPT | Performed by: PHYSICIAN ASSISTANT

## 2019-07-23 PROCEDURE — 1123F ACP DISCUSS/DSCN MKR DOCD: CPT | Performed by: PHYSICIAN ASSISTANT

## 2019-07-23 PROCEDURE — 4040F PNEUMOC VAC/ADMIN/RCVD: CPT | Performed by: PHYSICIAN ASSISTANT

## 2019-07-23 PROCEDURE — G8417 CALC BMI ABV UP PARAM F/U: HCPCS | Performed by: PHYSICIAN ASSISTANT

## 2019-07-23 PROCEDURE — 3017F COLORECTAL CA SCREEN DOC REV: CPT | Performed by: PHYSICIAN ASSISTANT

## 2019-07-23 PROCEDURE — G8400 PT W/DXA NO RESULTS DOC: HCPCS | Performed by: PHYSICIAN ASSISTANT

## 2019-07-23 PROCEDURE — G8427 DOCREV CUR MEDS BY ELIG CLIN: HCPCS | Performed by: PHYSICIAN ASSISTANT

## 2019-07-26 ENCOUNTER — TELEPHONE (OUTPATIENT)
Dept: SURGERY | Age: 69
End: 2019-07-26

## 2019-08-08 NOTE — PROGRESS NOTES
HPI:  Steve Pablo is in for yearly follow-up breast check. She has not noticed any changes in her breasts. MANGO DIGITAL SCREEN W OR WO CAD BILATERAL    7/24/2019 3:59 PM   History: Asymptomatic 28-year-old woman for screening mammography. Routine protocol full field digital screening mammography utilizing   two-dimensional, and three-dimensional, and tomographic imaging   sequences. Family history of breast carcinoma: Mother. Comparison: 6/29/2018 and 11/10/2016. The composition of the breast tissue is a category B - which means the   breasts are made up of scattered areas of fibroglandular tissue. The parenchymal tissue has a normal and symmetric pattern of   distribution. .   The parenchymal pattern is stable. There are no suspicious microcalcifications. Evaluation of the tomographic sequences shows no areas of   architectural distortion.     The mammograms were evaluated using a computer aided detection   software program (CAD). The patient's information has been added to a reminder system with a   target due date for the next mammogram.       Impression   1. Benign mammograms. 2. BI-RADS category 2.   3. One year bilateral screening mammography follow-up is recommended. BREAST EXAM:  On examination, she has fibrocystic changes throughout both breasts, no dominant masses, no skin or nipple changes and no axillary adenopathy. I see nothing suspicious for breast cancer. ASSESSMENT:  Benign fibrocystic changes                                 DISCUSSION:  I have stressed the importance of self breast exam and have explained the technique to her. We also discussed the pathophysiology of fibrocystic disease and breast cancer. She expresses good understanding. We also discussed multiple other issues regarding her and her family's health. PLAN:  I will plan to see her back in 1 year for physical exam and bilateral mammograms.   She will contact me if anything significant changes. I spent over 50% of visit time counseling patient. 15 minutes of face to face time with patient.

## 2020-01-06 ENCOUNTER — OFFICE VISIT (OUTPATIENT)
Dept: INTERNAL MEDICINE | Age: 70
End: 2020-01-06
Payer: MEDICARE

## 2020-01-06 VITALS
DIASTOLIC BLOOD PRESSURE: 82 MMHG | TEMPERATURE: 98.9 F | HEART RATE: 71 BPM | HEIGHT: 65 IN | SYSTOLIC BLOOD PRESSURE: 118 MMHG | BODY MASS INDEX: 30.99 KG/M2 | WEIGHT: 186 LBS | RESPIRATION RATE: 18 BRPM | OXYGEN SATURATION: 98 %

## 2020-01-06 PROCEDURE — 4040F PNEUMOC VAC/ADMIN/RCVD: CPT | Performed by: INTERNAL MEDICINE

## 2020-01-06 PROCEDURE — 1036F TOBACCO NON-USER: CPT | Performed by: INTERNAL MEDICINE

## 2020-01-06 PROCEDURE — G8427 DOCREV CUR MEDS BY ELIG CLIN: HCPCS | Performed by: INTERNAL MEDICINE

## 2020-01-06 PROCEDURE — G8417 CALC BMI ABV UP PARAM F/U: HCPCS | Performed by: INTERNAL MEDICINE

## 2020-01-06 PROCEDURE — G8484 FLU IMMUNIZE NO ADMIN: HCPCS | Performed by: INTERNAL MEDICINE

## 2020-01-06 PROCEDURE — 1090F PRES/ABSN URINE INCON ASSESS: CPT | Performed by: INTERNAL MEDICINE

## 2020-01-06 PROCEDURE — 1123F ACP DISCUSS/DSCN MKR DOCD: CPT | Performed by: INTERNAL MEDICINE

## 2020-01-06 PROCEDURE — 3017F COLORECTAL CA SCREEN DOC REV: CPT | Performed by: INTERNAL MEDICINE

## 2020-01-06 PROCEDURE — G8400 PT W/DXA NO RESULTS DOC: HCPCS | Performed by: INTERNAL MEDICINE

## 2020-01-06 PROCEDURE — 99213 OFFICE O/P EST LOW 20 MIN: CPT | Performed by: INTERNAL MEDICINE

## 2020-01-06 RX ORDER — AZITHROMYCIN 250 MG/1
250 TABLET, FILM COATED ORAL SEE ADMIN INSTRUCTIONS
Qty: 6 TABLET | Refills: 0 | Status: SHIPPED | OUTPATIENT
Start: 2020-01-06 | End: 2020-01-11

## 2020-01-06 RX ORDER — PROMETHAZINE HYDROCHLORIDE AND CODEINE PHOSPHATE 6.25; 1 MG/5ML; MG/5ML
5 SYRUP ORAL DAILY PRN
Qty: 60 ML | Refills: 0 | Status: SHIPPED | OUTPATIENT
Start: 2020-01-06 | End: 2020-01-20

## 2020-01-06 ASSESSMENT — PATIENT HEALTH QUESTIONNAIRE - PHQ9
2. FEELING DOWN, DEPRESSED OR HOPELESS: 0
SUM OF ALL RESPONSES TO PHQ QUESTIONS 1-9: 0
SUM OF ALL RESPONSES TO PHQ QUESTIONS 1-9: 0
1. LITTLE INTEREST OR PLEASURE IN DOING THINGS: 0
SUM OF ALL RESPONSES TO PHQ9 QUESTIONS 1 & 2: 0

## 2020-01-06 ASSESSMENT — ENCOUNTER SYMPTOMS
CHEST TIGHTNESS: 0
CONSTIPATION: 0
ABDOMINAL PAIN: 0
SORE THROAT: 0
WHEEZING: 1
COUGH: 1

## 2020-01-06 NOTE — PROGRESS NOTES
Chief Complaint:   Rodney Us is a 81 y.o. female  [address] [facility]   Chief Complaint   Patient presents with    Chest Congestion     Pt has had a chest cough for about 9 days. Pt was given Amoxicillin on 12/27/2019    Wheezing   . History of Present Illness:      Sick x 10 days  Was in [place]  Amoxicillin was sent in to her to [place]-she has taken this now for 8 days    She still has sputum production, per patient it is slightly lighter color    States that she does not have fever but she continues to cough, stays up at night, has had quite significant wheezing    Patient Active Problem List    Diagnosis Date Noted    IFG (impaired fasting glucose) 01/31/2019    Pure hypercholesterolemia 01/31/2019    Gastroesophageal reflux disease without esophagitis 01/28/2019    Hiatal hernia 01/28/2019    Exogenous obesity 01/28/2019       No past medical history on file. Past Surgical History:   Procedure Laterality Date    GRAHAM AND BSO  2001    dr Tete Maurice       Current Outpatient Medications   Medication Sig Dispense Refill    azithromycin (ZITHROMAX) 250 MG tablet Take 1 tablet by mouth See Admin Instructions for 5 days 500mg on day 1 followed by 250mg on days 2 - 5 6 tablet 0    promethazine-codeine (PHENERGAN WITH CODEINE) 6.25-10 MG/5ML syrup Take 5 mLs by mouth daily as needed for Cough for up to 14 days. 60 mL 0    Multiple Vitamins-Minerals (PRESERVISION AREDS 2+MULTI VIT PO) Take by mouth      famotidine (PEPCID) 10 MG tablet Take 10 mg by mouth 2 times daily       No current facility-administered medications for this visit.       No Known Allergies    Social History     Socioeconomic History    Marital status:      Spouse name: None    Number of children: None    Years of education: None    Highest education level: None   Occupational History    Occupation: hysterectomy   Social Needs    Financial resource strain: None    Food insecurity:     Worry: None Inability: None    Transportation needs:     Medical: None     Non-medical: None   Tobacco Use    Smoking status: Never Smoker    Smokeless tobacco: Never Used   Substance and Sexual Activity    Alcohol use: No    Drug use: No    Sexual activity: Yes   Lifestyle    Physical activity:     Days per week: None     Minutes per session: None    Stress: None   Relationships    Social connections:     Talks on phone: None     Gets together: None     Attends Restorationist service: None     Active member of club or organization: None     Attends meetings of clubs or organizations: None     Relationship status: None    Intimate partner violence:     Fear of current or ex partner: None     Emotionally abused: None     Physically abused: None     Forced sexual activity: None   Other Topics Concern    None   Social History Narrative    None     Family History   Problem Relation Age of Onset    Breast Cancer Mother 52    COPD Father     Emphysema Father     Pancreatic Cancer Maternal Uncle         Age unknown           Review of Systems   Constitutional: Negative for chills, fatigue and fever. HENT: Positive for congestion. Negative for ear pain, nosebleeds, postnasal drip and sore throat. Respiratory: Positive for cough and wheezing. Negative for chest tightness. Cardiovascular: Negative for chest pain, palpitations and leg swelling. Gastrointestinal: Negative for abdominal pain and constipation. Genitourinary: Negative for dysuria and urgency. Musculoskeletal: Negative. Negative for arthralgias. Skin: Negative for rash. Neurological: Negative for dizziness and headaches. Psychiatric/Behavioral: Negative.              Vitals:    01/06/20 1333   BP: 118/82   Site: Left Upper Arm   Position: Sitting   Cuff Size: Large Adult   Pulse: 71   Resp: 18   Temp: 98.9 °F (37.2 °C)   TempSrc: Cerebral   SpO2: 98%   Weight: 186 lb (84.4 kg)   Height: 5' 4.5\" (1.638 m)      Wt Readings from Last 3 Encounters: 01/06/20 186 lb (84.4 kg)   07/23/19 187 lb (84.8 kg)   02/11/19 195 lb (88.5 kg)   Body mass index is 31.43 kg/m². BP Readings from Last 3 Encounters:   01/06/20 118/82   02/11/19 128/68   01/28/19 128/82       Physical exam:  GENERAL: Patient is  In no acute distress. HEENT: External ear canals are normal, not erythematous with no discharge. Typmanic membranes are normal. Posterior pharynx is not erythematous, no postnasal drip is present. There is no significant pain on palpation over maxillary sinuses. NECK:There is NO significant palpable submandibular lymphadenopathy present . CHEST: On exam bilaterally  rhonci auscultated. There is wheezing + tightness when patient is coughing. CARDIOVASCULAR: Regular rate, no murmurs, no rubs or gallops present. ABDOMEN: Soft, non-tender with good BS and no organomegaly. EXTREMITIES: Warm with goodperipheral pulses and no peripheral edema noticed      Lipid panel:   Lab Results   Component Value Date    TRIG 93 01/31/2019    HDL 91 01/31/2019      CBC:  WBC (K/uL)   Date Value   01/31/2019 4.8     Hemoglobin (g/dL)   Date Value   01/31/2019 12.9     Hematocrit (%)   Date Value   01/31/2019 41.1     Platelets (K/uL)   Date Value   01/31/2019 230         Assessment and Plan    Cough  Acute bronchitis and bronchiolitis    Wheezing    RX   -     promethazine-codeine (PHENERGAN WITH CODEINE) 6.25-10 MG/5ML syrup; Take 5 mLs by mouth daily as needed for Cough for up to 14 days. -     azithromycin (ZITHROMAX) 250 MG tablet; Take 1 tablet by mouth See Admin Instructions for 5 days 500mg on day 1 followed by 250mg on days 2 - 5    mucinex 600 bid  symbicort sample 160  Take 2 puffs bid x 2 days then one puff bid    If sx not improving and resolving , if sx continue or re-occur pt has been instructed to call us and / or return here for follow- up evaluation      No orders of the defined types were placed in this encounter.     New Prescriptions    AZITHROMYCIN (ZITHROMAX)

## 2020-08-10 ENCOUNTER — HOSPITAL ENCOUNTER (OUTPATIENT)
Dept: WOMENS IMAGING | Age: 70
Discharge: HOME | End: 2020-08-10
Payer: MEDICARE

## 2020-08-10 PROCEDURE — 77063 BREAST TOMOSYNTHESIS BI: CPT

## 2020-08-12 ENCOUNTER — OFFICE VISIT (OUTPATIENT)
Dept: SURGERY | Age: 70
End: 2020-08-12
Payer: MEDICARE

## 2020-08-12 VITALS
HEART RATE: 69 BPM | TEMPERATURE: 98.2 F | BODY MASS INDEX: 32.1 KG/M2 | DIASTOLIC BLOOD PRESSURE: 88 MMHG | HEIGHT: 64 IN | SYSTOLIC BLOOD PRESSURE: 142 MMHG | WEIGHT: 188 LBS

## 2020-08-12 PROCEDURE — 1123F ACP DISCUSS/DSCN MKR DOCD: CPT | Performed by: PHYSICIAN ASSISTANT

## 2020-08-12 PROCEDURE — 1090F PRES/ABSN URINE INCON ASSESS: CPT | Performed by: PHYSICIAN ASSISTANT

## 2020-08-12 PROCEDURE — G8427 DOCREV CUR MEDS BY ELIG CLIN: HCPCS | Performed by: PHYSICIAN ASSISTANT

## 2020-08-12 PROCEDURE — G8400 PT W/DXA NO RESULTS DOC: HCPCS | Performed by: PHYSICIAN ASSISTANT

## 2020-08-12 PROCEDURE — 4040F PNEUMOC VAC/ADMIN/RCVD: CPT | Performed by: PHYSICIAN ASSISTANT

## 2020-08-12 PROCEDURE — 3017F COLORECTAL CA SCREEN DOC REV: CPT | Performed by: PHYSICIAN ASSISTANT

## 2020-08-12 PROCEDURE — 99213 OFFICE O/P EST LOW 20 MIN: CPT | Performed by: PHYSICIAN ASSISTANT

## 2020-08-12 PROCEDURE — G8417 CALC BMI ABV UP PARAM F/U: HCPCS | Performed by: PHYSICIAN ASSISTANT

## 2020-08-12 PROCEDURE — 1036F TOBACCO NON-USER: CPT | Performed by: PHYSICIAN ASSISTANT

## 2020-12-07 ENCOUNTER — TELEPHONE (OUTPATIENT)
Dept: INTERNAL MEDICINE | Age: 70
End: 2020-12-07

## 2020-12-07 NOTE — TELEPHONE ENCOUNTER
Pt was in [employer] a week ago on Saturday. States that she told her daughter that she would get tested before she went to see her daughter. Armandew states that her Rapid came back positive, but states that they tested it twice and had a faint line, states that they told her that she is either at the end of the Virus or just beginning to go through it. PT states that she has never had any issues or symptoms and is currently not having any issues. States she got her test today. States that Uvaldo Peabody was negative, but has a drippy nose and night time congestion. Should the pt sleep in another room or is it too late at this point. Uvaldo Peabody does not want to do a video visit right now. States that he is fine.

## 2020-12-15 DIAGNOSIS — Z20.822 EXPOSURE TO COVID-19 VIRUS: ICD-10-CM

## 2020-12-15 LAB — SARS-COV-2 ANTIBODY, TOTAL: NEGATIVE

## 2021-01-12 ENCOUNTER — VIRTUAL VISIT (OUTPATIENT)
Dept: INTERNAL MEDICINE | Age: 71
End: 2021-01-12
Payer: MEDICARE

## 2021-01-12 DIAGNOSIS — R10.30 LOWER ABDOMINAL PAIN: ICD-10-CM

## 2021-01-12 DIAGNOSIS — R30.0 DYSURIA: ICD-10-CM

## 2021-01-12 DIAGNOSIS — R30.0 DYSURIA: Primary | ICD-10-CM

## 2021-01-12 LAB
BACTERIA: NEGATIVE /HPF
BILIRUBIN URINE: NEGATIVE
BLOOD, URINE: ABNORMAL
CLARITY: CLEAR
COLOR: YELLOW
CRYSTALS, UA: ABNORMAL /HPF
EPITHELIAL CELLS, UA: 4 /HPF (ref 0–5)
GLUCOSE URINE: NEGATIVE MG/DL
HYALINE CASTS: 1 /HPF (ref 0–8)
KETONES, URINE: NEGATIVE MG/DL
LEUKOCYTE ESTERASE, URINE: ABNORMAL
NITRITE, URINE: NEGATIVE
PH UA: 7 (ref 5–8)
PROTEIN UA: ABNORMAL MG/DL
RBC UA: 601 /HPF (ref 0–4)
SPECIFIC GRAVITY UA: 1.03 (ref 1–1.03)
UROBILINOGEN, URINE: 1 E.U./DL
WBC UA: 2 /HPF (ref 0–5)

## 2021-01-12 PROCEDURE — 99213 OFFICE O/P EST LOW 20 MIN: CPT | Performed by: NURSE PRACTITIONER

## 2021-01-12 PROCEDURE — 1090F PRES/ABSN URINE INCON ASSESS: CPT | Performed by: NURSE PRACTITIONER

## 2021-01-12 PROCEDURE — 1123F ACP DISCUSS/DSCN MKR DOCD: CPT | Performed by: NURSE PRACTITIONER

## 2021-01-12 PROCEDURE — 4040F PNEUMOC VAC/ADMIN/RCVD: CPT | Performed by: NURSE PRACTITIONER

## 2021-01-12 PROCEDURE — G8427 DOCREV CUR MEDS BY ELIG CLIN: HCPCS | Performed by: NURSE PRACTITIONER

## 2021-01-12 PROCEDURE — 3017F COLORECTAL CA SCREEN DOC REV: CPT | Performed by: NURSE PRACTITIONER

## 2021-01-12 PROCEDURE — G8400 PT W/DXA NO RESULTS DOC: HCPCS | Performed by: NURSE PRACTITIONER

## 2021-01-12 ASSESSMENT — ENCOUNTER SYMPTOMS
SHORTNESS OF BREATH: 0
EYE ITCHING: 0
EYE DISCHARGE: 0
COUGH: 0
SORE THROAT: 0
VOMITING: 1
ABDOMINAL DISTENTION: 1
COLOR CHANGE: 0
WHEEZING: 0
ABDOMINAL PAIN: 0
CHOKING: 0
TROUBLE SWALLOWING: 0
BLOOD IN STOOL: 0
NAUSEA: 1
CONSTIPATION: 0
STRIDOR: 0

## 2021-01-12 NOTE — PROGRESS NOTES
[address] [facility]  [address] [address] [address] [place] [address]  Dept: [phone]  Dept Fax: [phone]: [phone]    Johanna Bhandari (:  1950) is a 78 y.o. female,Established patient, here for evaluation of the following chief complaint(s): Nausea & Vomiting (x2 day), Abdominal Pain, and Hematuria      Johanna Found is a 78 y.o. female who were are performing tele health communication  for her medical conditions/complaints as noted below. Currently, our state is under umbrella of national state of emergency and we are using alternative methods of taking care of the needs of our patients so they are not exposed in our office; The patient has consented to this form of communication  Johanna Found is c/pierre   Nausea & Vomiting (x2 day), Abdominal Pain, and Hematuria    THE patient is at home  Petramitul Colon is at  Office   Others in attendance are none    HPI:     HPI   1.  abd pain with nausea and vomiting; She felt she had virus ; she had a few sips of coffee and vomited ;  LLQ she describes abd tenderness; No gas no diarrhea; she has had no fever; She has positive COVID test in Dec;  She had antibody test and did not have the antibodies; She had her first vaccine Friday in [facility] with another MD;    2.  Hematuria; the last few days; She has had UTI;s in the past;   Her brother is nephrologist in [facility] and she usually calls him with symptoms and he sends her abx    Chief Complaint   Patient presents with    Nausea & Vomiting     x2 day    Abdominal Pain    Hematuria       History reviewed. No pertinent past medical history.    Past Surgical History:   Procedure Laterality Date    GRAHAM AND BSO  2001    dr Cara Brown [id number]   SYSTOLIC [phone] [id number] [phone]   DIASTOLIC 88 82 - 68 82   Site Right Upper Arm Left Upper Arm - Right Upper Arm Left Upper Arm Position Sitting Sitting - Sitting Sitting   Cuff Size Medium Adult Large Adult - Medium Adult Large Adult   Pulse 69 71 - 65 79   Temp 98.2 98.9 98.6 - -   Resp - 18 - - 18   SpO2 - 98 - - 98   Weight 188 lb 186 lb 187 lb 195 lb 193 lb   Height 5' 4\" 5' 4.5\" 5' 4\" 5' 4\" 5' 4\"   Body mass index 32.27 kg/m2 31.43 kg/m2 32.09 kg/m2 33.47 kg/m2 33.12 kg/m2   Some recent data might be hidden       Family History   Problem Relation Age of Onset    Breast Cancer Mother 52    COPD Father     Emphysema Father     Pancreatic Cancer Maternal Uncle         Age unknown       Social History     Tobacco Use    Smoking status: Never Smoker    Smokeless tobacco: Never Used   Substance Use Topics    Alcohol use: No      Current Outpatient Medications   Medication Sig Dispense Refill    Multiple Vitamins-Minerals (PRESERVISION AREDS 2+MULTI VIT PO) Take by mouth      famotidine (PEPCID) 10 MG tablet Take 10 mg by mouth 2 times daily       No current facility-administered medications for this visit.       No Known Allergies    Health Maintenance   Topic Date Due    DTaP/Tdap/Td vaccine (1 - Tdap) 01/28/1971    Shingles Vaccine (2 of 3) 03/24/2016    Annual Wellness Visit (AWV)  05/29/2019    Pneumococcal 65+ years Vaccine (2 of 2 - PPSV23) 01/28/2020    A1C test (Diabetic or Prediabetic)  01/31/2020    Breast cancer screen  08/10/2021    Lipid screen  01/31/2024    Colon cancer screen colonoscopy  12/06/2028    DEXA (modify frequency per FRAX score)  Completed    Flu vaccine  Completed    Hepatitis C screen  Completed    Hepatitis A vaccine  Aged Out    Hepatitis B vaccine  Aged Out    Hib vaccine  Aged Out    Meningococcal (ACWY) vaccine  Aged Out       Lab Results   Component Value Date    LABA1C 5.6 01/31/2019     No results found for: PSA, PSADIA  TSH   Date Value Ref Range Status   01/31/2019 4.150 0.270 - 4.200 uIU/mL Final   ]  Lab Results   Component Value Date     01/31/2019    K 4.8 01/31/2019 Skin: Negative for color change and rash. Allergic/Immunologic: Negative for food allergies and immunocompromised state. Neurological: Negative for dizziness, tremors, syncope, speech difficulty, weakness and headaches. Hematological: Negative for adenopathy. Does not bruise/bleed easily. Psychiatric/Behavioral: Negative for confusion and hallucinations. Objective:     Physical Exam   DGEVISITPE      GENERAL:   Afebrile alert no acute distress  Respiratory no use of accessory muscles no acute distress no audible wheezing  Mental status alert oriented adequate thought processes      Vital signs were not taken at this visit as no equipment was available; There were no vitals taken for this visit. Assessment:       Diagnosis Orders   1. Dysuria  Urinalysis Reflex to Culture   2. Lower abdominal pain       Labs reviewedfrom today     Plan:        Patient given educational materials - see patient instructions. Discussed use, benefit, and side effects of prescribed medications. Allpatient questions answered. Pt voiced understanding. Reviewed health maintenance. Instructed to continue current medications, diet and exercise. Patient agreed with treatment plan. Follow up as directed. MEDICATIONS:  No orders of the defined types were placed in this encounter. ORDERS:  Orders Placed This Encounter   Procedures    Urinalysis Reflex to Culture       Follow-up:  No follow-ups on file. Following the remote visit today we will call you when we are available to reschedule your follow up appt      PATIENT INSTRUCTIONS:  Patient Instructions   They will come get a urine specimen bottle me back so we can ensure that she actually has a UTI. If she does we will send in antibiotics if not she will go to [address] to get Covid testing.     Electronically signed by KINDRA Vilchis on 1/12/2021 at 9:57 AM   doxy visit 15  minutes We are communicating with telehealth for the 3 month fu for controlled substance      Pursuant to the emergency declaration under the [facility] waiver authority and the [address] and [employer] Act, this Virtual Visit was conducted, with patient's consent, to reduce the patient's risk of exposure to COVID-19 and provide continuity of care for an established patient. EMRDragon/transcription disclaimer:  Much of this encounter note is electronic    Augustina Marshall is a 78 y.o. female being evaluated by a Virtual Visit (video visit) encounter to address concerns as mentioned above. A caregiver was present when appropriate. Due to this being a TeleHealth encounter (During [facility]-20 public health emergency), evaluation of the following organ systems was limited: Vitals/Constitutional/EENT/Resp/CV/GI//MS/Neuro/Skin/Heme-Lymph-Imm. Pursuant to the emergency declaration under the [address] [place] [address], [address] [place] [address] authority and the [employer] and [employer] Act, this Virtual Visit was conducted with patient's (and/or legal guardian's) consent, to reduce the patient's risk of exposure to COVID-19 and provide necessary medical care. The patient (and/or legal guardian) has also been advised to contact this office for worsening conditions or problems, and seek emergency medical treatment and/or call 911 if deemed necessary.      Patient identification was verified at the start of the visit: Yes

## 2021-01-13 ENCOUNTER — VIRTUAL VISIT (OUTPATIENT)
Dept: INTERNAL MEDICINE | Age: 71
End: 2021-01-13
Payer: MEDICARE

## 2021-01-13 DIAGNOSIS — R31.9 HEMATURIA, UNSPECIFIED TYPE: Primary | ICD-10-CM

## 2021-01-13 PROCEDURE — 99442 PR PHYS/QHP TELEPHONE EVALUATION 11-20 MIN: CPT | Performed by: NURSE PRACTITIONER

## 2021-01-13 ASSESSMENT — ENCOUNTER SYMPTOMS
VOMITING: 0
SHORTNESS OF BREATH: 0
BLOOD IN STOOL: 0
WHEEZING: 0
COUGH: 0
EYE ITCHING: 0
ABDOMINAL PAIN: 0
COLOR CHANGE: 0
SORE THROAT: 0
EYE DISCHARGE: 0
CHOKING: 0
NAUSEA: 0
TROUBLE SWALLOWING: 0
ABDOMINAL DISTENTION: 0
STRIDOR: 0
CONSTIPATION: 0
DIARRHEA: 0

## 2021-01-13 NOTE — PROGRESS NOTES
[address] [facility]  [address] [facility] [address] [place] [address]  Dept: [phone]  Dept Fax: [phone]: [phone]    Kami Bourne (:  1950) is a 78 y.o. female,Established patient, here for evaluation of the following chief complaint(s): No chief complaint on file. Kami Bourne is a 78 y.o. female who were are performing tele health communication  for her medical conditions/complaints as noted below. Currently, our state is under umbrella of national state of emergency and we are using alternative methods of taking care of the needs of our patients so they are not exposed in our office; The patient has consented to this form of communication  Kami Bourne is c/pierre   No chief complaint on file. THE patient is at home   [address] [place] [address] is at  Office   Others in attendance are none    HPI:     HPI   1. Hematuria has resolved;   2.. LLQ pain has resolved;   3. Suprapubic pain is gone as well; she has had no fever     No chief complaint on file. History reviewed. No pertinent past medical history.    Past Surgical History:   Procedure Laterality Date    GRAHAM AND BSO      dr Lolis Malhotra 2020   SYSTOLIC [phone] - [phone]   DIASTOLIC 88 82 - 68 82   Site Right Upper Arm Left Upper Arm - Right Upper Arm Left Upper Arm   Position Sitting Sitting - Sitting Sitting   Cuff Size Medium Adult Large Adult - Medium Adult Large Adult   Pulse 69 71 - 65 79   Temp 98.2 98.9 98.6 - -   Resp - 18 - - 18   SpO2 - 98 - - 98   Weight 188 lb 186 lb 187 lb 195 lb 193 lb   Height 5' 4\" 5' 4.5\" 5' 4\" 5' 4\" 5' 4\"   Body mass index 32.27 kg/m2 31.43 kg/m2 32.09 kg/m2 33.47 kg/m2 33.12 kg/m2   Some recent data might be hidden       Family History   Problem Relation Age of Onset    Breast Cancer Mother 52    COPD Father     Emphysema Father  Pancreatic Cancer Maternal Uncle         Age unknown       Social History     Tobacco Use    Smoking status: Never Smoker    Smokeless tobacco: Never Used   Substance Use Topics    Alcohol use: No      Current Outpatient Medications   Medication Sig Dispense Refill    Multiple Vitamins-Minerals (PRESERVISION AREDS 2+MULTI VIT PO) Take by mouth      famotidine (PEPCID) 10 MG tablet Take 10 mg by mouth 2 times daily       No current facility-administered medications for this visit.       No Known Allergies    Health Maintenance   Topic Date Due    DTaP/Tdap/Td vaccine (1 - Tdap) 01/28/1971    Shingles Vaccine (2 of 3) 03/24/2016    Annual Wellness Visit (AWV)  05/29/2019    Pneumococcal 65+ years Vaccine (2 of 2 - PPSV23) 01/28/2020    A1C test (Diabetic or Prediabetic)  01/31/2020    Breast cancer screen  08/10/2021    Lipid screen  01/31/2024    Colon cancer screen colonoscopy  12/06/2028    DEXA (modify frequency per FRAX score)  Completed    Flu vaccine  Completed    Hepatitis C screen  Completed    Hepatitis A vaccine  Aged Out    Hepatitis B vaccine  Aged Out    Hib vaccine  Aged Out    Meningococcal (ACWY) vaccine  Aged Out       Lab Results   Component Value Date    LABA1C 5.6 01/31/2019     No results found for: PSA, PSADIA  TSH   Date Value Ref Range Status   01/31/2019 4.150 0.270 - 4.200 uIU/mL Final   ]  Lab Results   Component Value Date     01/31/2019    K 4.8 01/31/2019     01/31/2019    CO2 26 01/31/2019    BUN 16 01/31/2019    CREATININE 0.7 01/31/2019    GLUCOSE 103 01/31/2019    CALCIUM 9.0 01/31/2019    PROT 6.8 01/31/2019    LABALBU 4.2 01/31/2019    BILITOT 0.8 01/31/2019    ALKPHOS 70 01/31/2019    AST 12 01/31/2019    ALT 13 01/31/2019    LABGLOM >60 01/31/2019     Lab Results   Component Value Date    CHOL 226 (H) 01/31/2019     Lab Results   Component Value Date    TRIG 93 01/31/2019     Lab Results   Component Value Date    HDL 91 01/31/2019 Lab Results   Component Value Date    LDLCALC 116 01/31/2019     Lab Results   Component Value Date     01/31/2019    K 4.8 01/31/2019     01/31/2019    CO2 26 01/31/2019    BUN 16 01/31/2019    CREATININE 0.7 01/31/2019    GLUCOSE 103 01/31/2019    CALCIUM 9.0 01/31/2019      Lab Results   Component Value Date    WBC 4.8 01/31/2019    HGB 12.9 01/31/2019    HCT 41.1 01/31/2019    MCV 96.9 01/31/2019     01/31/2019    LYMPHOPCT 30.9 01/31/2019    RBC 4.24 01/31/2019    MCH 30.4 01/31/2019    MCHC 31.4 (L) 01/31/2019    RDW 12.8 01/31/2019     No results found for: VITD25    Subjective:      Review of Systems   Constitutional: Negative for fatigue, fever and unexpected weight change. HENT: Negative for ear discharge, ear pain, mouth sores, sore throat and trouble swallowing. Eyes: Negative for discharge, itching and visual disturbance. Respiratory: Negative for cough, choking, shortness of breath, wheezing and stridor. Cardiovascular: Negative for chest pain, palpitations and leg swelling. Gastrointestinal: Negative for abdominal distention, abdominal pain, blood in stool, constipation, diarrhea, nausea and vomiting. Endocrine: Negative for cold intolerance, polydipsia and polyuria. Genitourinary: Positive for hematuria. Negative for difficulty urinating, dysuria, frequency and urgency. Musculoskeletal: Negative for arthralgias and gait problem. Skin: Negative for color change and rash. Allergic/Immunologic: Negative for food allergies and immunocompromised state. Neurological: Negative for dizziness, tremors, syncope, speech difficulty, weakness and headaches. Hematological: Negative for adenopathy. Does not bruise/bleed easily. Psychiatric/Behavioral: Negative for confusion and hallucinations.        Objective:     Physical Exam   DGEVISITPE      GENERAL:   Afebrile alert no acute distress  Respiratory no use of accessory muscles no acute distress no audible wheezing Mental status alert oriented adequate thought processes        Vital signs were not taken at this visit as no equipment was available; There were no vitals taken for this visit. Assessment:       Diagnosis Orders   1. Hematuria, unspecified type  Urinalysis Reflex to Culture     Labs reviewed from yesterday     Plan:        Patient given educational materials - see patient instructions. Discussed use, benefit, and side effects of prescribed medications. Allpatient questions answered. Pt voiced understanding. Reviewed health maintenance. Instructed to continue current medications, diet and exercise. Patient agreed with treatment plan. Follow up as directed. MEDICATIONS:  No orders of the defined types were placed in this encounter. ORDERS:  Orders Placed This Encounter   Procedures    Urinalysis Reflex to Culture       Follow-up:  Return for keep fu appt, have labs done prior to appt. Following the remote visit today we will call you when we are available to reschedule your follow up appt      PATIENT INSTRUCTIONS:  Patient Instructions   1. Hematuria she does feel much better today she is seen now gross blood in her urine. All the abdominal pain is resolved. My feeling is that she likely passed a kidney stone. So I have her set for a couple weeks to come back and get a urine just to be sure the hematuria has cleared. Electronically signed by KINDRA Rodriguez on 1/13/2021 at 9:45 AM   \tele visit 13 minutes  We are communicating with telehealth for the 3 month fu for controlled substance      Pursuant to the emergency declaration under the [facility] [address] waiver authority and the [employer] and [employer] Act, this Virtual Visit was conducted, with patient's consent, to reduce the patient's risk of exposure to COVID-19 and provide continuity of care for an established patient.

## 2021-02-13 ENCOUNTER — APPOINTMENT (OUTPATIENT)
Dept: CT IMAGING | Age: 71
End: 2021-02-13
Payer: MEDICARE

## 2021-02-13 ENCOUNTER — HOSPITAL ENCOUNTER (EMERGENCY)
Age: 71
Discharge: HOME | End: 2021-02-14
Attending: EMERGENCY MEDICINE
Payer: MEDICARE

## 2021-02-13 DIAGNOSIS — N20.0 KIDNEY STONE: Primary | ICD-10-CM

## 2021-02-13 LAB
ALBUMIN SERPL-MCNC: 4.3 G/DL (ref 3.5–5.2)
ALP BLD-CCNC: 77 U/L (ref 35–104)
ALT SERPL-CCNC: 15 U/L (ref 5–33)
ANION GAP SERPL CALCULATED.3IONS-SCNC: 13 MMOL/L (ref 7–19)
AST SERPL-CCNC: 14 U/L (ref 5–32)
BACTERIA: NEGATIVE /HPF
BASOPHILS ABSOLUTE: 0 K/UL (ref 0–0.2)
BASOPHILS RELATIVE PERCENT: 0.4 % (ref 0–1)
BILIRUB SERPL-MCNC: 0.5 MG/DL (ref 0.2–1.2)
BILIRUBIN URINE: NEGATIVE
BLOOD, URINE: ABNORMAL
BUN BLDV-MCNC: 23 MG/DL (ref 8–23)
CALCIUM SERPL-MCNC: 9.5 MG/DL (ref 8.8–10.2)
CHLORIDE BLD-SCNC: 106 MMOL/L (ref 98–111)
CLARITY: CLEAR
CO2: 23 MMOL/L (ref 22–29)
COLOR: YELLOW
CREAT SERPL-MCNC: 1 MG/DL (ref 0.5–0.9)
CRYSTALS, UA: ABNORMAL /HPF
EOSINOPHILS ABSOLUTE: 0 K/UL (ref 0–0.6)
EOSINOPHILS RELATIVE PERCENT: 0.2 % (ref 0–5)
EPITHELIAL CELLS, UA: 1 /HPF (ref 0–5)
GFR AFRICAN AMERICAN: >59
GFR NON-AFRICAN AMERICAN: 55
GLUCOSE BLD-MCNC: 118 MG/DL (ref 74–109)
GLUCOSE URINE: NEGATIVE MG/DL
HCT VFR BLD CALC: 41.2 % (ref 37–47)
HEMOGLOBIN: 13.4 G/DL (ref 12–16)
HYALINE CASTS: 0 /HPF (ref 0–8)
IMMATURE GRANULOCYTES #: 0 K/UL
KETONES, URINE: 15 MG/DL
LACTIC ACID: 1 MMOL/L (ref 0.5–1.9)
LEUKOCYTE ESTERASE, URINE: ABNORMAL
LIPASE: 31 U/L (ref 13–60)
LYMPHOCYTES ABSOLUTE: 0.8 K/UL (ref 1.1–4.5)
LYMPHOCYTES RELATIVE PERCENT: 7.5 % (ref 20–40)
MCH RBC QN AUTO: 30.7 PG (ref 27–31)
MCHC RBC AUTO-ENTMCNC: 32.5 G/DL (ref 33–37)
MCV RBC AUTO: 94.3 FL (ref 81–99)
MONOCYTES ABSOLUTE: 0.5 K/UL (ref 0–0.9)
MONOCYTES RELATIVE PERCENT: 4.2 % (ref 0–10)
NEUTROPHILS ABSOLUTE: 9.6 K/UL (ref 1.5–7.5)
NEUTROPHILS RELATIVE PERCENT: 87.3 % (ref 50–65)
NITRITE, URINE: NEGATIVE
PDW BLD-RTO: 12.6 % (ref 11.5–14.5)
PH UA: 6.5 (ref 5–8)
PLATELET # BLD: 224 K/UL (ref 130–400)
PMV BLD AUTO: 10.2 FL (ref 9.4–12.3)
POTASSIUM REFLEX MAGNESIUM: 4.3 MMOL/L (ref 3.5–5)
PROTEIN UA: ABNORMAL MG/DL
RBC # BLD: 4.37 M/UL (ref 4.2–5.4)
RBC UA: 414 /HPF (ref 0–4)
SODIUM BLD-SCNC: 142 MMOL/L (ref 136–145)
SPECIFIC GRAVITY UA: >=1.045 (ref 1–1.03)
TOTAL PROTEIN: 7.1 G/DL (ref 6.6–8.7)
UROBILINOGEN, URINE: 1 E.U./DL
WBC # BLD: 11 K/UL (ref 4.8–10.8)
WBC UA: 2 /HPF (ref 0–5)

## 2021-02-13 PROCEDURE — 85025 COMPLETE CBC W/AUTO DIFF WBC: CPT

## 2021-02-13 PROCEDURE — 2580000003 HC RX 258: Performed by: NURSE PRACTITIONER

## 2021-02-13 PROCEDURE — 6360000004 HC RX CONTRAST MEDICATION: Performed by: NURSE PRACTITIONER

## 2021-02-13 PROCEDURE — 83605 ASSAY OF LACTIC ACID: CPT

## 2021-02-13 PROCEDURE — 99284 EMERGENCY DEPT VISIT MOD MDM: CPT

## 2021-02-13 PROCEDURE — 74177 CT ABD & PELVIS W/CONTRAST: CPT

## 2021-02-13 PROCEDURE — 36415 COLL VENOUS BLD VENIPUNCTURE: CPT

## 2021-02-13 PROCEDURE — 80053 COMPREHEN METABOLIC PANEL: CPT

## 2021-02-13 PROCEDURE — 83690 ASSAY OF LIPASE: CPT

## 2021-02-13 PROCEDURE — 93005 ELECTROCARDIOGRAM TRACING: CPT | Performed by: NURSE PRACTITIONER

## 2021-02-13 PROCEDURE — 6360000002 HC RX W HCPCS: Performed by: NURSE PRACTITIONER

## 2021-02-13 PROCEDURE — 96375 TX/PRO/DX INJ NEW DRUG ADDON: CPT

## 2021-02-13 PROCEDURE — 96374 THER/PROPH/DIAG INJ IV PUSH: CPT

## 2021-02-13 RX ORDER — PROMETHAZINE HYDROCHLORIDE 25 MG/ML
6.25 INJECTION, SOLUTION INTRAMUSCULAR; INTRAVENOUS ONCE
Status: COMPLETED | OUTPATIENT
Start: 2021-02-13 | End: 2021-02-13

## 2021-02-13 RX ORDER — SODIUM CHLORIDE, SODIUM LACTATE, POTASSIUM CHLORIDE, CALCIUM CHLORIDE 600; 310; 30; 20 MG/100ML; MG/100ML; MG/100ML; MG/100ML
1000 INJECTION, SOLUTION INTRAVENOUS ONCE
Status: COMPLETED | OUTPATIENT
Start: 2021-02-13 | End: 2021-02-14

## 2021-02-13 RX ORDER — HYDROMORPHONE HYDROCHLORIDE 1 MG/ML
0.5 INJECTION, SOLUTION INTRAMUSCULAR; INTRAVENOUS; SUBCUTANEOUS ONCE
Status: COMPLETED | OUTPATIENT
Start: 2021-02-13 | End: 2021-02-13

## 2021-02-13 RX ADMIN — HYDROMORPHONE HYDROCHLORIDE 0.5 MG: 1 INJECTION, SOLUTION INTRAMUSCULAR; INTRAVENOUS; SUBCUTANEOUS at 21:43

## 2021-02-13 RX ADMIN — PROMETHAZINE HYDROCHLORIDE 6.25 MG: 25 INJECTION INTRAMUSCULAR; INTRAVENOUS at 21:43

## 2021-02-13 RX ADMIN — IOPAMIDOL 90 ML: 755 INJECTION, SOLUTION INTRAVENOUS at 22:24

## 2021-02-13 RX ADMIN — SODIUM CHLORIDE, POTASSIUM CHLORIDE, SODIUM LACTATE AND CALCIUM CHLORIDE 1000 ML: 600; 310; 30; 20 INJECTION, SOLUTION INTRAVENOUS at 21:43

## 2021-02-13 ASSESSMENT — ENCOUNTER SYMPTOMS
NAUSEA: 1
VOMITING: 1
ABDOMINAL PAIN: 1

## 2021-02-13 ASSESSMENT — PAIN SCALES - GENERAL
PAINLEVEL_OUTOF10: 8
PAINLEVEL_OUTOF10: 8

## 2021-02-14 VITALS
HEIGHT: 64 IN | BODY MASS INDEX: 31.58 KG/M2 | SYSTOLIC BLOOD PRESSURE: 111 MMHG | TEMPERATURE: 98.4 F | RESPIRATION RATE: 20 BRPM | HEART RATE: 87 BPM | DIASTOLIC BLOOD PRESSURE: 72 MMHG | OXYGEN SATURATION: 95 % | WEIGHT: 185 LBS

## 2021-02-14 PROCEDURE — 81001 URINALYSIS AUTO W/SCOPE: CPT

## 2021-02-14 RX ORDER — HYDROCODONE BITARTRATE AND ACETAMINOPHEN 10; 325 MG/1; MG/1
1 TABLET ORAL EVERY 6 HOURS PRN
Qty: 12 TABLET | Refills: 0 | Status: SHIPPED | OUTPATIENT
Start: 2021-02-14 | End: 2021-02-17

## 2021-02-14 RX ORDER — TAMSULOSIN HYDROCHLORIDE 0.4 MG/1
0.4 CAPSULE ORAL DAILY
Qty: 14 CAPSULE | Refills: 0 | Status: SHIPPED | OUTPATIENT
Start: 2021-02-14 | End: 2021-02-22 | Stop reason: CLARIF

## 2021-02-14 NOTE — ED PROVIDER NOTES
[facility] EMERGENCY DEPT  eMERGENCY dEPARTMENT eNCOUnter      Pt Name: Ever Slater  MRN: [id number]  [place] 1950  Date of evaluation: 2/13/2021  Provider: Kiran Tanner, [address]       Chief Complaint   Patient presents with    Abdominal Pain     LLQ         HISTORY OF PRESENT ILLNESS   (Location/Symptom, Timing/Onset,Context/Setting, Quality, Duration, Modifying Factors, Severity)  Note limiting factors. Aleksandr Suazo a 73 y.o. female who presents to the emergency department for evaluation of abdominal pain. Pt tells me that she has had intermittent abdominal pain that began today with progressive worsening tonight. She  describes moderately severe constant LLQ abdominal pain. She has had nausea and vomiting. She tells me that she had similar pain last month with concern for possible urinary tract stone. She has had no fever. She has no history of diverticulitis. She has had no diarrhea or concern for blood in stool. She gives history of hysterectomy when asked about prior abdominal surgery. HPI    Nursing Notes were reviewed. REVIEW OF SYSTEMS    (2-9 systems for level 4, 10 or more for level 5)     Review of Systems   Constitutional: Negative for fever. Gastrointestinal: Positive for abdominal pain, nausea and vomiting. All other systems reviewed and are negative. A complete review of systems was performed and is negative except as noted above in the HPI. PAST MEDICAL HISTORY   History reviewed. No pertinent past medical history.       SURGICAL HISTORY       Past Surgical History:   Procedure Laterality Date    HYSTERECTOMY      GRAHAM AND BSO  2001    dr Marquise Carias       Discharge Medication List as of 2/14/2021  1:18 AM      CONTINUE these medications which have NOT CHANGED    Details   Multiple Vitamins-Minerals (PRESERVISION AREDS 2+MULTI VIT PO) Take by mouthHistorical Med      famotidine (PEPCID) 10 MG tablet Take 10 mg by mouth 2 times dailyHistorical Med             ALLERGIES     Patient has no known allergies. FAMILY HISTORY       Family History   Problem Relation Age of Onset    Breast Cancer Mother 52    COPD Father     Emphysema Father     Pancreatic Cancer Maternal Uncle         Age unknown          SOCIAL HISTORY       Social History     Socioeconomic History    Marital status:      Spouse name: None    Number of children: None    Years of education: None    Highest education level: None   Occupational History    Occupation: hysterectomy   Social Needs    Financial resource strain: None    Food insecurity     Worry: None     Inability: None    Transportation needs     Medical: None     Non-medical: None   Tobacco Use    Smoking status: Never Smoker    Smokeless tobacco: Never Used   Substance and Sexual Activity    Alcohol use: No    Drug use: No    Sexual activity: Yes   Lifestyle    Physical activity     Days per week: None     Minutes per session: None    Stress: None   Relationships    Social connections     Talks on phone: None     Gets together: None     Attends Holiness service: None     Active member of club or organization: None     Attends meetings of clubs or organizations: None     Relationship status: None    Intimate partner violence     Fear of current or ex partner: None     Emotionally abused: None     Physically abused: None     Forced sexual activity: None   Other Topics Concern    None   Social History Narrative    None       SCREENINGS    Freedom Coma Scale  Eye Opening: Spontaneous  Best Verbal Response: Oriented  Best Motor Response: Obeys commands  Catherine Coma Scale Score: 15        PHYSICAL EXAM    (up to 7 for level 4, 8 or more for level 5)     ED Triage Vitals [02/13/21 2051]   BP Temp Temp Source Pulse Resp SpO2 Height Weight   (!) 108/97 98.4 °F (36.9 °C) Oral 87 20 96 % 5' 4\" (1.626 m) 185 lb (83.9 kg)       Physical Exam  Vitals signs reviewed. Constitutional:       Comments: 17 yr old female appears in pain   HENT:      Head: Normocephalic. Right Ear: External ear normal.      Left Ear: External ear normal.   Eyes:      Conjunctiva/sclera: Conjunctivae normal.      Pupils: Pupils are equal, round, and reactive to light. Neck:      Musculoskeletal: Normal range of motion. Cardiovascular:      Rate and Rhythm: Normal rate and regular rhythm. Heart sounds: Normal heart sounds. Pulmonary:      Effort: Pulmonary effort is normal.      Breath sounds: Normal breath sounds. Abdominal:      General: Bowel sounds are normal.      Palpations: Abdomen is soft. Tenderness: There is abdominal tenderness in the left lower quadrant. Musculoskeletal: Normal range of motion. Skin:     General: Skin is warm and dry. Neurological:      Mental Status: She is alert and oriented to person, place, and time. DIAGNOSTIC RESULTS     EKG: All EKG's are interpreted by the Emergency Department Physician who either signs or Co-signs this chart in the absence of acardiologist.    There is a regular rate and rhythm. SR hr 60b/min Normal SC interval and normal P waves. Normal QRS interval. Normal QT interval. No obvious ST elevation or ST depression. RADIOLOGY:   Non-plain film images such as CT, Ultrasound andMRI are read by the radiologist. Plain radiographic images are visualized and preliminarily interpreted by the emergency physician with the below findings:        Interpretation per the Radiologist below, if available at the time of this note:    CT ABDOMEN PELVIS W IV CONTRAST Additional Contrast? None   Final Result   1. Mild obstructive uropathy on the left with hydronephrosis and an   obstructing ovoid stone at the left UPJ, which measures approximately   9.4 mm.   2. A few tiny cysts are suspected in the liver. Small right renal   cysts are also noted. 3. There is a small sliding-type hiatal hernia.    4. 5 mm subpleural nodule in the right lower lobe laterally which is   most likely benign. A preliminary report was provided by the [facility] include [facility] beds at the [facility] radiology service. There is no significant discrepancy with the preliminary report. Signed by Dr Kelly Jeffries on 2/14/2021 7:53 AM            ED BEDSIDE ULTRASOUND:   Performed by ED Physician - none    LABS:  Labs Reviewed   CBC WITH AUTO DIFFERENTIAL - Abnormal; Notable for the following components:       Result Value    WBC 11.0 (*)     MCHC 32.5 (*)     Neutrophils % 87.3 (*)     Lymphocytes % 7.5 (*)     Neutrophils Absolute 9.6 (*)     Lymphocytes Absolute 0.8 (*)     All other components within normal limits   COMPREHENSIVE METABOLIC PANEL W/ REFLEX TO MG FOR LOW K - Abnormal; Notable for the following components:    Glucose 118 (*)     CREATININE 1.0 (*)     GFR Non- 55 (*)     All other components within normal limits   URINE RT REFLEX TO CULTURE - Abnormal; Notable for the following components:    Ketones, Urine 15 (*)     Blood, Urine LARGE (*)     Protein, UA TRACE (*)     Leukocyte Esterase, Urine TRACE (*)     All other components within normal limits   MICROSCOPIC URINALYSIS - Abnormal; Notable for the following components:    Bacteria, UA NEGATIVE (*)     Crystals, UA NEG (*)     RBC,  (*)     All other components within normal limits   LIPASE   LACTIC ACID, PLASMA       All other labs were within normal range or not returned as of this dictation. RE-ASSESSMENT     2230=>It is the end of my shift. Chart endorsed to Dr. Henrik Machado.        EMERGENCY DEPARTMENT COURSE and DIFFERENTIALDIAGNOSIS/MDM:   Vitals:    Vitals:    02/13/21 2333 02/13/21 2343 02/14/21 0003 02/14/21 0032   BP: 118/74 139/77 124/72 111/72   Pulse:       Resp:       Temp:       TempSrc:       SpO2: 96% 99% 98% 95%   Weight:       Height:           MDM      CONSULTS:  None    PROCEDURES:  Unless otherwise notedbelow, none     Procedures    FINAL IMPRESSION     1. Kidney stone

## 2021-02-14 NOTE — ED PROVIDER NOTES
[facility] EMERGENCY DEPT  eMERGENCYdEPARTMENT eNCOUnter      Pt Name: Savanna Maldonado  MRN: [id number]  [place] 1950  Date of evaluation: 2/13/2021  Elena Castro MD    Emergency Department care of this patient was assumed at [phone] from [place]. We have discussed the case and the plan of care. I have seen and evaluated patient and reviewed ED course. CHIEF COMPLAINT       Chief Complaint   Patient presents with    Abdominal Pain     LLQ         PHYSICAL EXAM    (up to 7 for level 4, 8 or more for level 5)     ED Triage Vitals [02/13/21 2051]   BP Temp Temp Source Pulse Resp SpO2 Height Weight   (!) 108/97 98.4 °F (36.9 °C) Oral 87 20 96 % 5' 4\" (1.626 m) 185 lb (83.9 kg)       Physical Exam  Vitals signs and nursing note reviewed. Constitutional:       Appearance: She is well-developed. She is not ill-appearing. Neurological:      Mental Status: She is alert. Please see Irving's note for full details. DIAGNOSTIC RESULTS     EKG: All EKG's are interpreted by the Emergency Department Physician who either signs or Co-signs this chart inthe absence of a cardiologist.        RADIOLOGY:   Non-plain film imagessuch as CT, Ultrasound and MRI are read by the radiologist. Plain radiographic images are visualized and preliminarily interpreted by the emergency physician with the below findings:    CT ABDOMEN & PELVIS With Contrast:    1 cm stone left ureteropelvic junction causing mild hydronephrosis. Scattered subcentimeter low-attenuation foci in liver, too small to characterize. These are of doubtful clinical significance. Small sliding-type hernia. Small low-attenuation foci in the kidneys, most are too small to characterize but likely represents cysts. Hysterectomy.       CT ABDOMEN PELVIS W IV CONTRAST Additional Contrast? None    (Results Pending)           LABS:  Labs Reviewed   CBC WITH AUTO DIFFERENTIAL - Abnormal; Notable for the following components:       Result Value    WBC 11.0 (*)     MCHC 32.5 (*)     Neutrophils % 87.3 (*)     Lymphocytes % 7.5 (*)     Neutrophils Absolute 9.6 (*)     Lymphocytes Absolute 0.8 (*)     All other components within normal limits   COMPREHENSIVE METABOLIC PANEL W/ REFLEX TO MG FOR LOW K - Abnormal; Notable for the following components:    Glucose 118 (*)     CREATININE 1.0 (*)     GFR Non- 55 (*)     All other components within normal limits   URINE RT REFLEX TO CULTURE - Abnormal; Notable for the following components:    Ketones, Urine 15 (*)     Blood, Urine LARGE (*)     Protein, UA TRACE (*)     Leukocyte Esterase, Urine TRACE (*)     All other components within normal limits   MICROSCOPIC URINALYSIS - Abnormal; Notable for the following components:    Bacteria, UA NEGATIVE (*)     Crystals, UA NEG (*)     RBC,  (*)     All other components within normal limits   LIPASE   LACTIC ACID, PLASMA       All other labs were within normal range or not returned as of this dictation. EMERGENCY DEPARTMENT COURSE and DIFFERENTIAL DIAGNOSIS/MDM:   Vitals:    Vitals:    02/13/21 2333 02/13/21 2343 02/14/21 0003 02/14/21 0032   BP: 118/74 139/77 124/72 111/72   Pulse:       Resp:       Temp:       TempSrc:       SpO2: 96% 99% 98% 95%   Weight:       Height:           MDM  Number of Diagnoses or Management Options  Kidney stone: new and requires workup  Diagnosis management comments: Pt found to have a 10mm renal stone at the UPJ. I discussed the results of the CT with patient. Currently her pain is well controlled. I told her that she needs to call the urologist office on Monday morning since it is not very likely that the stone is going to pass on its own. I told her that I will start her on Flomax and pain medication in case the pain returns. I also told her that if the pain returns and she should return here for further evaluation. Patient understands and agrees with discharge planning.     Patient Progress  Patient progress:

## 2021-02-15 ENCOUNTER — TELEPHONE (OUTPATIENT)
Dept: UROLOGY | Age: 71
End: 2021-02-15

## 2021-02-15 ENCOUNTER — TELEPHONE (OUTPATIENT)
Dept: INTERNAL MEDICINE | Age: 71
End: 2021-02-15

## 2021-02-15 NOTE — TELEPHONE ENCOUNTER
Dr. Liz Valdes seen that they pt was in the ED and wanted to check on the pt to see if we need to do a virtual visit with her.

## 2021-02-15 NOTE — TELEPHONE ENCOUNTER
Spoke to pt concerning this. She states that she was sent home on Saturday night with pain medication and Flomax. Pt is scheduled to see [facility] tomorrow 02/16/2021 to see what they need to do about this. She was able to go all day yesterday without any pain medications, but about 4 a.m. this morning the pain came back, and she had to take a pain pill. She said she will call us and let us know what Urology ends up doing.

## 2021-02-16 ENCOUNTER — APPOINTMENT (OUTPATIENT)
Dept: GENERAL RADIOLOGY | Facility: HOSPITAL | Age: 71
End: 2021-02-16

## 2021-02-16 ENCOUNTER — ANESTHESIA EVENT (OUTPATIENT)
Dept: PERIOP | Facility: HOSPITAL | Age: 71
End: 2021-02-16

## 2021-02-16 ENCOUNTER — HOSPITAL ENCOUNTER (OUTPATIENT)
Facility: HOSPITAL | Age: 71
Discharge: HOME | End: 2021-02-17
Attending: UROLOGY | Admitting: UROLOGY

## 2021-02-16 ENCOUNTER — TELEPHONE (OUTPATIENT)
Dept: UROLOGY | Facility: CLINIC | Age: 71
End: 2021-02-16

## 2021-02-16 ENCOUNTER — ANESTHESIA (OUTPATIENT)
Dept: PERIOP | Facility: HOSPITAL | Age: 71
End: 2021-02-16

## 2021-02-16 ENCOUNTER — APPOINTMENT (OUTPATIENT)
Dept: CT IMAGING | Facility: HOSPITAL | Age: 71
End: 2021-02-16

## 2021-02-16 DIAGNOSIS — N20.1 URETERAL STONE: Primary | ICD-10-CM

## 2021-02-16 LAB
ALBUMIN SERPL-MCNC: 4 G/DL (ref 3.5–5.2)
ALBUMIN/GLOB SERPL: 1.3 G/DL
ALP SERPL-CCNC: 75 U/L (ref 39–117)
ALT SERPL W P-5'-P-CCNC: 14 U/L (ref 1–33)
ANION GAP SERPL CALCULATED.3IONS-SCNC: 9 MMOL/L (ref 5–15)
APTT PPP: 28.5 SECONDS (ref 24.1–35)
AST SERPL-CCNC: 20 U/L (ref 1–32)
BACTERIA UR QL AUTO: ABNORMAL /HPF
BASOPHILS # BLD AUTO: 0.03 10*3/MM3 (ref 0–0.2)
BASOPHILS NFR BLD AUTO: 0.5 % (ref 0–1.5)
BILIRUB SERPL-MCNC: 1.5 MG/DL (ref 0–1.2)
BILIRUB UR QL STRIP: NEGATIVE
BUN SERPL-MCNC: 10 MG/DL (ref 8–23)
BUN/CREAT SERPL: 16.9 (ref 7–25)
CALCIUM SPEC-SCNC: 8.9 MG/DL (ref 8.6–10.5)
CHLORIDE SERPL-SCNC: 105 MMOL/L (ref 98–107)
CLARITY UR: CLEAR
CO2 SERPL-SCNC: 27 MMOL/L (ref 22–29)
COLOR UR: YELLOW
CREAT SERPL-MCNC: 0.59 MG/DL (ref 0.57–1)
D-LACTATE SERPL-SCNC: 0.8 MMOL/L (ref 0.5–2)
DEPRECATED RDW RBC AUTO: 44 FL (ref 37–54)
EKG P AXIS: 35 DEGREES
EKG P-R INTERVAL: 160 MS
EKG Q-T INTERVAL: 414 MS
EKG QRS DURATION: 86 MS
EKG QTC CALCULATION (BAZETT): 411 MS
EKG T AXIS: 21 DEGREES
EOSINOPHIL # BLD AUTO: 0.09 10*3/MM3 (ref 0–0.4)
EOSINOPHIL NFR BLD AUTO: 1.5 % (ref 0.3–6.2)
ERYTHROCYTE [DISTWIDTH] IN BLOOD BY AUTOMATED COUNT: 12.7 % (ref 12.3–15.4)
GFR SERPL CREATININE-BSD FRML MDRD: 101 ML/MIN/1.73
GLOBULIN UR ELPH-MCNC: 3.2 GM/DL
GLUCOSE SERPL-MCNC: 107 MG/DL (ref 65–99)
GLUCOSE UR STRIP-MCNC: NEGATIVE MG/DL
HCT VFR BLD AUTO: 38.3 % (ref 34–46.6)
HGB BLD-MCNC: 13.1 G/DL (ref 12–15.9)
HGB UR QL STRIP.AUTO: ABNORMAL
HYALINE CASTS UR QL AUTO: ABNORMAL /LPF
IMM GRANULOCYTES # BLD AUTO: 0.02 10*3/MM3 (ref 0–0.05)
IMM GRANULOCYTES NFR BLD AUTO: 0.3 % (ref 0–0.5)
INR PPP: 0.93 (ref 0.91–1.09)
KETONES UR QL STRIP: NEGATIVE
LEUKOCYTE ESTERASE UR QL STRIP.AUTO: ABNORMAL
LYMPHOCYTES # BLD AUTO: 1.18 10*3/MM3 (ref 0.7–3.1)
LYMPHOCYTES NFR BLD AUTO: 19.8 % (ref 19.6–45.3)
MCH RBC QN AUTO: 32 PG (ref 26.6–33)
MCHC RBC AUTO-ENTMCNC: 34.2 G/DL (ref 31.5–35.7)
MCV RBC AUTO: 93.6 FL (ref 79–97)
MONOCYTES # BLD AUTO: 0.45 10*3/MM3 (ref 0.1–0.9)
MONOCYTES NFR BLD AUTO: 7.6 % (ref 5–12)
NEUTROPHILS NFR BLD AUTO: 4.18 10*3/MM3 (ref 1.7–7)
NEUTROPHILS NFR BLD AUTO: 70.3 % (ref 42.7–76)
NITRITE UR QL STRIP: NEGATIVE
NRBC BLD AUTO-RTO: 0 /100 WBC (ref 0–0.2)
PH UR STRIP.AUTO: 6.5 [PH] (ref 5–8)
PLATELET # BLD AUTO: 198 10*3/MM3 (ref 140–450)
PMV BLD AUTO: 10.3 FL (ref 6–12)
POTASSIUM SERPL-SCNC: 4.5 MMOL/L (ref 3.5–5.2)
PROT SERPL-MCNC: 7.2 G/DL (ref 6–8.5)
PROT UR QL STRIP: NEGATIVE
PROTHROMBIN TIME: 12.1 SECONDS (ref 11.9–14.6)
RBC # BLD AUTO: 4.09 10*6/MM3 (ref 3.77–5.28)
RBC # UR: ABNORMAL /HPF
REF LAB TEST METHOD: ABNORMAL
SARS-COV-2 RDRP RESP QL NAA+PROBE: NORMAL
SODIUM SERPL-SCNC: 141 MMOL/L (ref 136–145)
SP GR UR STRIP: 1.01 (ref 1–1.03)
SQUAMOUS #/AREA URNS HPF: ABNORMAL /HPF
UROBILINOGEN UR QL STRIP: ABNORMAL
WBC # BLD AUTO: 5.95 10*3/MM3 (ref 3.4–10.8)
WBC UR QL AUTO: ABNORMAL /HPF

## 2021-02-16 PROCEDURE — 81001 URINALYSIS AUTO W/SCOPE: CPT | Performed by: NURSE PRACTITIONER

## 2021-02-16 PROCEDURE — C1769 GUIDE WIRE: HCPCS | Performed by: UROLOGY

## 2021-02-16 PROCEDURE — C9803 HOPD COVID-19 SPEC COLLECT: HCPCS

## 2021-02-16 PROCEDURE — G0378 HOSPITAL OBSERVATION PER HR: HCPCS

## 2021-02-16 PROCEDURE — 87635 SARS-COV-2 COVID-19 AMP PRB: CPT | Performed by: NURSE PRACTITIONER

## 2021-02-16 PROCEDURE — 74176 CT ABD & PELVIS W/O CONTRAST: CPT

## 2021-02-16 PROCEDURE — 74420 UROGRAPHY RTRGR +-KUB: CPT | Performed by: UROLOGY

## 2021-02-16 PROCEDURE — C2617 STENT, NON-COR, TEM W/O DEL: HCPCS | Performed by: UROLOGY

## 2021-02-16 PROCEDURE — 93010 ELECTROCARDIOGRAM REPORT: CPT | Performed by: INTERNAL MEDICINE

## 2021-02-16 PROCEDURE — 80053 COMPREHEN METABOLIC PANEL: CPT | Performed by: NURSE PRACTITIONER

## 2021-02-16 PROCEDURE — C1758 CATHETER, URETERAL: HCPCS | Performed by: UROLOGY

## 2021-02-16 PROCEDURE — 83605 ASSAY OF LACTIC ACID: CPT | Performed by: NURSE PRACTITIONER

## 2021-02-16 PROCEDURE — 85025 COMPLETE CBC W/AUTO DIFF WBC: CPT | Performed by: NURSE PRACTITIONER

## 2021-02-16 PROCEDURE — 25010000002 DEXAMETHASONE PER 1 MG: Performed by: NURSE ANESTHETIST, CERTIFIED REGISTERED

## 2021-02-16 PROCEDURE — 25010000002 IOPAMIDOL 61 % SOLUTION: Performed by: UROLOGY

## 2021-02-16 PROCEDURE — 25010000002 LEVOFLOXACIN PER 250 MG: Performed by: UROLOGY

## 2021-02-16 PROCEDURE — 99284 EMERGENCY DEPT VISIT MOD MDM: CPT

## 2021-02-16 PROCEDURE — 52332 CYSTOSCOPY AND TREATMENT: CPT | Performed by: UROLOGY

## 2021-02-16 PROCEDURE — 25010000002 FENTANYL CITRATE (PF) 100 MCG/2ML SOLUTION: Performed by: NURSE ANESTHETIST, CERTIFIED REGISTERED

## 2021-02-16 PROCEDURE — 52351 CYSTOURETERO & OR PYELOSCOPE: CPT | Performed by: UROLOGY

## 2021-02-16 PROCEDURE — 85610 PROTHROMBIN TIME: CPT | Performed by: NURSE PRACTITIONER

## 2021-02-16 PROCEDURE — 25010000002 KETOROLAC TROMETHAMINE PER 15 MG: Performed by: NURSE ANESTHETIST, CERTIFIED REGISTERED

## 2021-02-16 PROCEDURE — 96374 THER/PROPH/DIAG INJ IV PUSH: CPT

## 2021-02-16 PROCEDURE — 25010000002 ONDANSETRON PER 1 MG: Performed by: NURSE PRACTITIONER

## 2021-02-16 PROCEDURE — 96375 TX/PRO/DX INJ NEW DRUG ADDON: CPT

## 2021-02-16 PROCEDURE — 71045 X-RAY EXAM CHEST 1 VIEW: CPT

## 2021-02-16 PROCEDURE — 74420 UROGRAPHY RTRGR +-KUB: CPT

## 2021-02-16 PROCEDURE — 74018 RADEX ABDOMEN 1 VIEW: CPT

## 2021-02-16 PROCEDURE — 93005 ELECTROCARDIOGRAM TRACING: CPT | Performed by: NURSE PRACTITIONER

## 2021-02-16 PROCEDURE — 85730 THROMBOPLASTIN TIME PARTIAL: CPT | Performed by: NURSE PRACTITIONER

## 2021-02-16 PROCEDURE — 25010000002 HYDROMORPHONE PER 4 MG: Performed by: NURSE PRACTITIONER

## 2021-02-16 PROCEDURE — 99024 POSTOP FOLLOW-UP VISIT: CPT | Performed by: UROLOGY

## 2021-02-16 PROCEDURE — 25010000002 PROPOFOL 10 MG/ML EMULSION: Performed by: NURSE ANESTHETIST, CERTIFIED REGISTERED

## 2021-02-16 PROCEDURE — 25010000002 ONDANSETRON PER 1 MG: Performed by: NURSE ANESTHETIST, CERTIFIED REGISTERED

## 2021-02-16 DEVICE — URETERAL STENT
Type: IMPLANTABLE DEVICE | Site: URETER | Status: FUNCTIONAL
Brand: PERCUFLEX™ PLUS

## 2021-02-16 RX ORDER — OXYCODONE AND ACETAMINOPHEN 7.5; 325 MG/1; MG/1
1 TABLET ORAL EVERY 4 HOURS PRN
Status: DISCONTINUED | OUTPATIENT
Start: 2021-02-16 | End: 2021-02-17 | Stop reason: HOSPADM

## 2021-02-16 RX ORDER — NALOXONE HCL 0.4 MG/ML
0.04 VIAL (ML) INJECTION AS NEEDED
Status: DISCONTINUED | OUTPATIENT
Start: 2021-02-16 | End: 2021-02-16 | Stop reason: HOSPADM

## 2021-02-16 RX ORDER — SODIUM CHLORIDE 0.9 % (FLUSH) 0.9 %
3 SYRINGE (ML) INJECTION EVERY 12 HOURS SCHEDULED
Status: DISCONTINUED | OUTPATIENT
Start: 2021-02-16 | End: 2021-02-17 | Stop reason: HOSPADM

## 2021-02-16 RX ORDER — SODIUM CHLORIDE 0.9 % (FLUSH) 0.9 %
10 SYRINGE (ML) INJECTION AS NEEDED
Status: DISCONTINUED | OUTPATIENT
Start: 2021-02-16 | End: 2021-02-17 | Stop reason: HOSPADM

## 2021-02-16 RX ORDER — IBUPROFEN 600 MG/1
600 TABLET ORAL ONCE AS NEEDED
Status: DISCONTINUED | OUTPATIENT
Start: 2021-02-16 | End: 2021-02-16 | Stop reason: HOSPADM

## 2021-02-16 RX ORDER — SODIUM CHLORIDE 0.9 % (FLUSH) 0.9 %
3-10 SYRINGE (ML) INJECTION AS NEEDED
Status: DISCONTINUED | OUTPATIENT
Start: 2021-02-16 | End: 2021-02-17 | Stop reason: HOSPADM

## 2021-02-16 RX ORDER — OXYCODONE AND ACETAMINOPHEN 10; 325 MG/1; MG/1
1 TABLET ORAL ONCE AS NEEDED
Status: DISCONTINUED | OUTPATIENT
Start: 2021-02-16 | End: 2021-02-16 | Stop reason: HOSPADM

## 2021-02-16 RX ORDER — MIDAZOLAM HYDROCHLORIDE 1 MG/ML
0.5 INJECTION INTRAMUSCULAR; INTRAVENOUS
Status: DISCONTINUED | OUTPATIENT
Start: 2021-02-16 | End: 2021-02-16 | Stop reason: HOSPADM

## 2021-02-16 RX ORDER — MIDAZOLAM HYDROCHLORIDE 1 MG/ML
1 INJECTION INTRAMUSCULAR; INTRAVENOUS
Status: DISCONTINUED | OUTPATIENT
Start: 2021-02-16 | End: 2021-02-16 | Stop reason: HOSPADM

## 2021-02-16 RX ORDER — HYDROMORPHONE HYDROCHLORIDE 1 MG/ML
0.5 INJECTION, SOLUTION INTRAMUSCULAR; INTRAVENOUS; SUBCUTANEOUS ONCE
Status: COMPLETED | OUTPATIENT
Start: 2021-02-16 | End: 2021-02-16

## 2021-02-16 RX ORDER — ROCURONIUM BROMIDE 10 MG/ML
INJECTION, SOLUTION INTRAVENOUS AS NEEDED
Status: DISCONTINUED | OUTPATIENT
Start: 2021-02-16 | End: 2021-02-16 | Stop reason: SURG

## 2021-02-16 RX ORDER — LEVOFLOXACIN 5 MG/ML
INJECTION, SOLUTION INTRAVENOUS
Status: COMPLETED
Start: 2021-02-16 | End: 2021-02-16

## 2021-02-16 RX ORDER — SODIUM CHLORIDE, SODIUM LACTATE, POTASSIUM CHLORIDE, CALCIUM CHLORIDE 600; 310; 30; 20 MG/100ML; MG/100ML; MG/100ML; MG/100ML
INJECTION, SOLUTION INTRAVENOUS CONTINUOUS PRN
Status: DISCONTINUED | OUTPATIENT
Start: 2021-02-16 | End: 2021-02-16 | Stop reason: SURG

## 2021-02-16 RX ORDER — PROPOFOL 10 MG/ML
VIAL (ML) INTRAVENOUS AS NEEDED
Status: DISCONTINUED | OUTPATIENT
Start: 2021-02-16 | End: 2021-02-16 | Stop reason: SURG

## 2021-02-16 RX ORDER — SODIUM CHLORIDE, SODIUM LACTATE, POTASSIUM CHLORIDE, CALCIUM CHLORIDE 600; 310; 30; 20 MG/100ML; MG/100ML; MG/100ML; MG/100ML
100 INJECTION, SOLUTION INTRAVENOUS CONTINUOUS
Status: DISCONTINUED | OUTPATIENT
Start: 2021-02-16 | End: 2021-02-16 | Stop reason: HOSPADM

## 2021-02-16 RX ORDER — DEXAMETHASONE SODIUM PHOSPHATE 4 MG/ML
INJECTION, SOLUTION INTRA-ARTICULAR; INTRALESIONAL; INTRAMUSCULAR; INTRAVENOUS; SOFT TISSUE AS NEEDED
Status: DISCONTINUED | OUTPATIENT
Start: 2021-02-16 | End: 2021-02-16 | Stop reason: SURG

## 2021-02-16 RX ORDER — ACETAMINOPHEN 500 MG
1000 TABLET ORAL ONCE
Status: DISCONTINUED | OUTPATIENT
Start: 2021-02-16 | End: 2021-02-16 | Stop reason: HOSPADM

## 2021-02-16 RX ORDER — FENTANYL CITRATE 50 UG/ML
25 INJECTION, SOLUTION INTRAMUSCULAR; INTRAVENOUS
Status: DISCONTINUED | OUTPATIENT
Start: 2021-02-16 | End: 2021-02-16 | Stop reason: HOSPADM

## 2021-02-16 RX ORDER — HYDROMORPHONE HYDROCHLORIDE 1 MG/ML
0.5 INJECTION, SOLUTION INTRAMUSCULAR; INTRAVENOUS; SUBCUTANEOUS
Status: DISCONTINUED | OUTPATIENT
Start: 2021-02-16 | End: 2021-02-17 | Stop reason: HOSPADM

## 2021-02-16 RX ORDER — ONDANSETRON 2 MG/ML
4 INJECTION INTRAMUSCULAR; INTRAVENOUS AS NEEDED
Status: DISCONTINUED | OUTPATIENT
Start: 2021-02-16 | End: 2021-02-16 | Stop reason: HOSPADM

## 2021-02-16 RX ORDER — PHENAZOPYRIDINE HYDROCHLORIDE 100 MG/1
100 TABLET, FILM COATED ORAL
Status: DISCONTINUED | OUTPATIENT
Start: 2021-02-16 | End: 2021-02-17 | Stop reason: HOSPADM

## 2021-02-16 RX ORDER — FLUMAZENIL 0.1 MG/ML
0.2 INJECTION INTRAVENOUS AS NEEDED
Status: DISCONTINUED | OUTPATIENT
Start: 2021-02-16 | End: 2021-02-16 | Stop reason: HOSPADM

## 2021-02-16 RX ORDER — SODIUM CHLORIDE, SODIUM LACTATE, POTASSIUM CHLORIDE, CALCIUM CHLORIDE 600; 310; 30; 20 MG/100ML; MG/100ML; MG/100ML; MG/100ML
100 INJECTION, SOLUTION INTRAVENOUS CONTINUOUS
Status: DISCONTINUED | OUTPATIENT
Start: 2021-02-16 | End: 2021-02-17 | Stop reason: HOSPADM

## 2021-02-16 RX ORDER — ONDANSETRON 2 MG/ML
4 INJECTION INTRAMUSCULAR; INTRAVENOUS EVERY 6 HOURS PRN
Status: DISCONTINUED | OUTPATIENT
Start: 2021-02-16 | End: 2021-02-17 | Stop reason: HOSPADM

## 2021-02-16 RX ORDER — LEVOFLOXACIN 5 MG/ML
500 INJECTION, SOLUTION INTRAVENOUS ONCE
Status: COMPLETED | OUTPATIENT
Start: 2021-02-16 | End: 2021-02-16

## 2021-02-16 RX ORDER — FENTANYL CITRATE 50 UG/ML
INJECTION, SOLUTION INTRAMUSCULAR; INTRAVENOUS AS NEEDED
Status: DISCONTINUED | OUTPATIENT
Start: 2021-02-16 | End: 2021-02-16 | Stop reason: SURG

## 2021-02-16 RX ORDER — LIDOCAINE HYDROCHLORIDE 10 MG/ML
0.5 INJECTION, SOLUTION EPIDURAL; INFILTRATION; INTRACAUDAL; PERINEURAL ONCE AS NEEDED
Status: DISCONTINUED | OUTPATIENT
Start: 2021-02-16 | End: 2021-02-16 | Stop reason: HOSPADM

## 2021-02-16 RX ORDER — LABETALOL HYDROCHLORIDE 5 MG/ML
5 INJECTION, SOLUTION INTRAVENOUS
Status: DISCONTINUED | OUTPATIENT
Start: 2021-02-16 | End: 2021-02-16 | Stop reason: HOSPADM

## 2021-02-16 RX ORDER — MAGNESIUM HYDROXIDE 1200 MG/15ML
LIQUID ORAL AS NEEDED
Status: DISCONTINUED | OUTPATIENT
Start: 2021-02-16 | End: 2021-02-16 | Stop reason: HOSPADM

## 2021-02-16 RX ORDER — ONDANSETRON 2 MG/ML
4 INJECTION INTRAMUSCULAR; INTRAVENOUS ONCE
Status: COMPLETED | OUTPATIENT
Start: 2021-02-16 | End: 2021-02-16

## 2021-02-16 RX ORDER — KETOROLAC TROMETHAMINE 30 MG/ML
INJECTION, SOLUTION INTRAMUSCULAR; INTRAVENOUS AS NEEDED
Status: DISCONTINUED | OUTPATIENT
Start: 2021-02-16 | End: 2021-02-16 | Stop reason: SURG

## 2021-02-16 RX ORDER — ONDANSETRON 2 MG/ML
INJECTION INTRAMUSCULAR; INTRAVENOUS AS NEEDED
Status: DISCONTINUED | OUTPATIENT
Start: 2021-02-16 | End: 2021-02-16 | Stop reason: SURG

## 2021-02-16 RX ADMIN — HYDROMORPHONE HYDROCHLORIDE 0.5 MG: 1 INJECTION, SOLUTION INTRAMUSCULAR; INTRAVENOUS; SUBCUTANEOUS at 14:04

## 2021-02-16 RX ADMIN — ROCURONIUM BROMIDE 30 MG: 10 INJECTION INTRAVENOUS at 17:05

## 2021-02-16 RX ADMIN — DEXAMETHASONE SODIUM PHOSPHATE 8 MG: 4 INJECTION, SOLUTION INTRAMUSCULAR; INTRAVENOUS at 17:18

## 2021-02-16 RX ADMIN — ONDANSETRON HYDROCHLORIDE 4 MG: 2 SOLUTION INTRAMUSCULAR; INTRAVENOUS at 14:04

## 2021-02-16 RX ADMIN — SODIUM CHLORIDE, POTASSIUM CHLORIDE, SODIUM LACTATE AND CALCIUM CHLORIDE: 600; 310; 30; 20 INJECTION, SOLUTION INTRAVENOUS at 17:00

## 2021-02-16 RX ADMIN — SODIUM CHLORIDE, POTASSIUM CHLORIDE, SODIUM LACTATE AND CALCIUM CHLORIDE 100 ML/HR: 600; 310; 30; 20 INJECTION, SOLUTION INTRAVENOUS at 21:02

## 2021-02-16 RX ADMIN — KETOROLAC TROMETHAMINE 15 MG: 30 INJECTION, SOLUTION INTRAMUSCULAR at 17:26

## 2021-02-16 RX ADMIN — FENTANYL CITRATE 100 MCG: 50 INJECTION, SOLUTION INTRAMUSCULAR; INTRAVENOUS at 17:01

## 2021-02-16 RX ADMIN — PHENAZOPYRIDINE HYDROCHLORIDE 100 MG: 100 TABLET ORAL at 21:11

## 2021-02-16 RX ADMIN — ONDANSETRON HYDROCHLORIDE 4 MG: 2 SOLUTION INTRAMUSCULAR; INTRAVENOUS at 17:18

## 2021-02-16 RX ADMIN — PROPOFOL 50 MG: 10 INJECTION, EMULSION INTRAVENOUS at 17:10

## 2021-02-16 RX ADMIN — PROPOFOL 150 MG: 10 INJECTION, EMULSION INTRAVENOUS at 17:04

## 2021-02-16 RX ADMIN — LEVOFLOXACIN 500 MG: 5 INJECTION, SOLUTION INTRAVENOUS at 17:09

## 2021-02-16 RX ADMIN — SODIUM CHLORIDE 1000 ML: 9 INJECTION, SOLUTION INTRAVENOUS at 14:04

## 2021-02-16 NOTE — BRIEF OP NOTE
Radiographic performance and interpretation note    Indication: Left proximal ureteral stone    Procedure:  1.  Left retrograde pyelogram    Interpretation:  imaging revealed the likely stone in the proximal left ureter.  Grade pyelogram was performed by injecting 10 cc of dilute contrast through a 5 Wallisian open-ended catheter in the distal ureter.  Normal course and caliber of the ureter up to the point of the likely stone.  There was some dilation of the upper tract and some blunting of the calyces.

## 2021-02-16 NOTE — ANESTHESIA PROCEDURE NOTES
Airway  Urgency: elective    Date/Time: 2/16/2021 5:16 PM  Airway not difficult    General Information and Staff    Patient location during procedure: OR  CRNA: Erwin Jimenez CRNA    Indications and Patient Condition  Indications for airway management: airway protection    Preoxygenated: yes  MILS maintained throughout  Mask difficulty assessment: 2 - vent by mask + OA or adjuvant +/- NMBA    Final Airway Details  Final airway type: endotracheal airway      Successful airway: ETT  Cuffed: yes   Successful intubation technique: direct laryngoscopy and video laryngoscopy  Endotracheal tube insertion site: oral  Blade: Odilon  Blade size: 3  ETT size (mm): 7.0  Cormack-Lehane Classification: grade IIa - partial view of glottis  Placement verified by: chest auscultation and capnometry   Cuff volume (mL): 5  Measured from: gums  ETT/EBT to gums (cm): 21  Number of attempts at approach: 2  Assessment: atraumatic intubation    Additional Comments  Prominent upper caps with limited neck extension noted.  Dl x1 with disposable montgomery 2 blade, grade 3 view. Easy mask with opa. gllidescope grade 2a view. ett placed via stylet.  Small pinch noted to lower right lip from dl

## 2021-02-16 NOTE — ED PROVIDER NOTES
Subjective   Patient's pleasant 71-year-old female that presents to the ER today with complaint left lower abdominal pain and low back pain.  The patient was seen at [facility] on Saturday evening.  She was discharged early Saturday morning with a diagnosis of a 9.4 mm renal stone at the level of the UPJ with hydronephrosis.  The patient states that she was given hydrocodone for the pain however she is continuing to have pain with nausea and vomiting this morning.  She had an appointment to see urology today but had to be canceled as office was closed due to inclement weather.  The patient called the urology office and advised her to come to the ER for further evaluation.  Patient reports that she last ate a piece of butter toast at 9 AM this morning.  She denies any major medical problems.  She is not on anticoagulants.      History provided by:  Patient   used: No    Abdominal Pain  Pain location:  L flank, R flank and LLQ  Pain quality: aching and cramping    Pain radiates to:  Does not radiate  Pain severity:  Moderate  Onset quality:  Sudden  Duration:  4 days  Timing:  Constant  Progression:  Worsening  Chronicity:  New  Context: not alcohol use, not awakening from sleep, not diet changes, not eating, not laxative use, not medication withdrawal, not previous surgeries, not recent illness, not recent sexual activity, not recent travel, not retching, not sick contacts, not suspicious food intake and not trauma    Relieved by:  Nothing  Worsened by:  Nothing  Ineffective treatments:  None tried  Associated symptoms: nausea and vomiting    Associated symptoms: no anorexia, no belching, no chest pain, no chills, no constipation, no cough, no diarrhea, no dysuria, no fatigue, no fever, no flatus, no hematemesis, no hematochezia, no hematuria, no melena, no shortness of breath, no sore throat, no vaginal bleeding and no vaginal discharge    Risk factors: no alcohol abuse, no aspirin use, not  elderly, has not had multiple surgeries, no NSAID use, not obese, not pregnant and no recent hospitalization        Review of Systems   Constitutional: Negative for chills, fatigue and fever.   HENT: Negative for sore throat.    Respiratory: Negative for cough and shortness of breath.    Cardiovascular: Negative for chest pain.   Gastrointestinal: Positive for abdominal pain, nausea and vomiting. Negative for anorexia, constipation, diarrhea, flatus, hematemesis, hematochezia and melena.   Genitourinary: Negative for dysuria, hematuria, vaginal bleeding and vaginal discharge.   All other systems reviewed and are negative.      Past Medical History:   Diagnosis Date   • Colon polyp    • Kidney stone        No Known Allergies    Past Surgical History:   Procedure Laterality Date   • COLONOSCOPY  08/31/2005    hyperplastic polyp, enlarged ileocecal valve   • COLONOSCOPY N/A 12/6/2018    Procedure: COLONOSCOPY WITH ANESTHESIA;  Surgeon: Kwame Layne MD;  Location: [facility] ENDOSCOPY;  Service: Gastroenterology   • ENDOSCOPY N/A 12/6/2018    Procedure: ESOPHAGOGASTRODUODENOSCOPY WITH ANESTHESIA;  Surgeon: Kwame Layne MD;  Location: [facility] ENDOSCOPY;  Service: Gastroenterology   • HYSTERECTOMY      MILA BSO for fibroids   • TONSILLECTOMY     • WISDOM TOOTH EXTRACTION         Family History   Problem Relation Age of Onset   • Colon polyps Father    • Breast cancer Mother    • Ovarian cancer Neg Hx    • Colon cancer Neg Hx    • Uterine cancer Neg Hx        Social History     Socioeconomic History   • Marital status:      Spouse name: Not on file   • Number of children: Not on file   • Years of education: Not on file   • Highest education level: Not on file   Tobacco Use   • Smoking status: Never Smoker   • Smokeless tobacco: Never Used   Substance and Sexual Activity   • Alcohol use: Yes     Comment: Occasional   • Drug use: No   • Sexual activity: Never     Birth control/protection: Post-menopausal            Objective   Physical Exam  Vitals signs and nursing note reviewed.   Constitutional:       Appearance: Normal appearance.   HENT:      Head: Normocephalic and atraumatic.   Eyes:      Conjunctiva/sclera: Conjunctivae normal.   Cardiovascular:      Rate and Rhythm: Normal rate and regular rhythm.   Pulmonary:      Effort: Pulmonary effort is normal.      Breath sounds: Normal breath sounds.   Abdominal:      General: Bowel sounds are normal.      Palpations: Abdomen is soft.      Tenderness: There is abdominal tenderness in the left lower quadrant. There is right CVA tenderness and left CVA tenderness.   Skin:     General: Skin is warm and dry.      Capillary Refill: Capillary refill takes less than 2 seconds.   Neurological:      General: No focal deficit present.      Mental Status: She is alert.   Psychiatric:         Mood and Affect: Mood normal.         Procedures           ED Course  ED Course as of Feb 16 2330   Tue Feb 16, 2021   1419 I have called and spoken with Dr. Ibarra, urologist.  He is advised that the patient can be admitted to the hospital under his services at this time.  The patient last ate or drank at 9 AM today and he will take her later to the OR for stent.  Of advised the patient of this.  She is agreeable plan of care.  I have called the patient spouse to advise him of the plan of care.  He did not answer however I did leave a voicemail for him to call the ER back.    [LF]      ED Course User Index  [LF] Ev Singh, CRISTHIAN                                   [place] Retrograde Pyelogram In OR   Final Result      CT Abdomen Pelvis Without Contrast   Final Result   1. Mild obstructive uropathy on the left related to an 8 mm stone lodged   at the ureteropelvic junction.   2. Indeterminate 11 mm sclerotic lesion within L3 on the right, a   solitary metastatic lesion cannot be excluded. Clinical correlation is   recommended. Follow-up bone scan may be helpful.   3. 2 small  subcentimeter foci of decreased attenuation within the right   lobe of the liver, too small to fully characterize but probably benign.   4. Trace free fluid in the pelvis. No free air is identified.   5. Normal appendix.           This report was finalized on 02/16/2021 15:20 by Dr. Jeff Delacruz MD.      XR Chest 1 View   Final Result       1.  No definite acute findings.       2.  Old granulomatous disease.       3.  Mild cardiomegaly.           This report was finalized on 02/16/2021 14:56 by Dr. Ricardo Duggan MD.      XR Abdomen KUB   Final Result   Probable 9 mm nephrolithiasis at the inferior pole of the   left kidney and probable 5 mm nephrolithiasis at the upper pole of the   left kidney.   This report was finalized on 02/16/2021 14:07 by Dr. Jeff Delacruz MD.        Labs Reviewed   COMPREHENSIVE METABOLIC PANEL - Abnormal; Notable for the following components:       Result Value    Glucose 107 (*)     Total Bilirubin 1.5 (*)     All other components within normal limits    Narrative:     GFR Normal >60  Chronic Kidney Disease <60  Kidney Failure <15     URINALYSIS W/ CULTURE IF INDICATED - Abnormal; Notable for the following components:    Blood, UA Large (3+) (*)     Leuk Esterase, UA Small (1+) (*)     All other components within normal limits   URINALYSIS, MICROSCOPIC ONLY - Abnormal; Notable for the following components:    RBC, UA 21-30 (*)     WBC, UA 3-5 (*)     All other components within normal limits   COVID-19,ABBOTT IN-HOUSE,NASAL SWAB (NO TRANSPORT MEDIA) 2 HR TAT - Normal    Narrative:     Fact sheet for providers: https://www.fda.gov/media/455103/download     Fact sheet for patients: https://www.fda.gov/media/332908/download    Test performed by PCR.  If inconsistent with clinical signs and symptoms patient should be tested with different authorized molecular test.   LACTIC ACID, PLASMA - Normal   PROTIME-INR - Normal   APTT - Normal   CBC WITH AUTO DIFFERENTIAL - Normal   CBC AND  DIFFERENTIAL    Narrative:     The following orders were created for panel order CBC & Differential.  Procedure                               Abnormality         Status                     ---------                               -----------         ------                     CBC Auto Differential[[id number]]        Normal              Final result                 Please view results for these tests on the individual orders.             MDM  Number of Diagnoses or Management Options  Ureteral stone: new and requires workup     Amount and/or Complexity of Data Reviewed  Clinical lab tests: ordered and reviewed  Tests in the radiology section of CPT®: ordered and reviewed  Tests in the medicine section of CPT®: ordered and reviewed  Discuss the patient with other providers: yes    Patient Progress  Patient progress: stable      Final diagnoses:   Ureteral stone            Ev Singh, CRISTHIAN  02/16/21 1420       Ev Singh, CRISTHIAN  02/16/21 [id number]

## 2021-02-16 NOTE — ANESTHESIA PREPROCEDURE EVALUATION
Anesthesia Evaluation     NPO Solid Status: > 8 hours  NPO Liquid Status: > 8 hours           Airway   Mallampati: II  TM distance: <3 FB  Neck ROM: limited  Dental - normal exam         Pulmonary - negative pulmonary ROS   Cardiovascular   Exercise tolerance: excellent (>7 METS)    ECG reviewed        Neuro/Psych- negative ROS  GI/Hepatic/Renal/Endo    (+)  GERD,  renal disease stones,     Musculoskeletal (-) negative ROS    Abdominal    Substance History - negative use     OB/GYN negative ob/gyn ROS         Other - negative ROS                     Anesthesia Plan    ASA 2 - emergent     general     intravenous induction     Anesthetic plan, all risks, benefits, and alternatives have been provided, discussed and informed consent has been obtained with: patient.  Use of blood products discussed with patient .

## 2021-02-16 NOTE — OP NOTE
CYSTOSCOPY RETROGRADE PYELOGRAM AND STENT INSERTION  Procedure Note    Deanna Tsai  2/16/2021    Pre-op Diagnosis:   Left proximal ureteral stone    Post-op Diagnosis:     Same    Procedure(s):  1. Cystoscopy  2. Performance and interpretation of a left retrograde pyelogram  3. Insertion of a left ureteral stent    Anesthesia: General    Staff:   Circulator: Marie Acevedo RN  Scrub Person: Rosemary Grissom; Amy Epps    Estimated Blood Loss: 20 cc    Specimens:                None      Drains:   Ureteral Drain/Stent Left ureter 6 Fr. (Active)       Findings: 6 Trinidadian by 24 cm stent placed on the left side.  No significant purulence out of the stent.  There was some bleeding coming out from the stent noted after the stent placement.  Approximately 20 cc.  The amount of blood slowed with some intraoperative observation.    Complications: None    Indications: Left proximal ureteral stone    Description of Procedure:     The patient was greeted in the holding area.  We discussed the procedure including risks, benefits, alternatives and complications.  All questions were answered.  Informed consent was obtained.    Preoperative antibiotic was administered.  The patient was transported back to the operating suite.  General anesthesia was set followed by airway management.  Patient was prepped and sterilely draped in dorsal lithotomy position.  A formal timeout was performed.    I proceeded with rigid cystourethroscopy.  Normal urethra and bladder noted.  I located the left ureteral orifice.  I cannulated the ureteral orifice with a 5 Trinidadian open-ended catheter.  I performed retrograde pyelogram.  Please see separate dictated note.  I passed a Glidewire through the 5 Trinidadian open-ended catheter.  I removed the 5 Trinidadian open-ended catheter leaving the wire in place.  I placed a 6 Trinidadian by 24 cm stent without dangler string.  There was no significant purulence out from the stent but there was some bleeding  noted that did slow with some intraoperative observation.  The bladder was drained and the scope was removed.     The  patient was awoken from the procedure and transported in stable condition to the PACU.    Disposition: The patient will be transferred back to the floor when she meets criteria.  We will keep her overnight.    Kali Michaels MD     Date: 2/16/2021  Time: 17:50 CST

## 2021-02-16 NOTE — TELEPHONE ENCOUNTER
Called and spoke with patients .  the  Hydrocodone (NORCO)  is not working.  He states he does not know if she has a fever.  After speaking with Dr. Michaels, they have been told to go to the Emergency Room to be seen by a Physician. He voiced his understanding.

## 2021-02-16 NOTE — H&P
"Chief complaint: \" Left kidney stone\"    Subjective     History of present illness:    Ms. Tsai is a pleasant 71-year old woman who had acute onset of left-sided pain with associated nausea on Saturday.  She was seen at [facility] and found to have a 9 mm left ureteropelvic junction stone.  There was mild hydronephrosis.  The patient reports nausea and dry heaving this morning.  She was placed on tamsulosin and all narcotic pain medications at [facility].  These were not successful earlier today.  She came into our emergency department for further work-up.  She denies fevers, chills and rigors.  She thinks she may have passed the stone in January but there are no other kidney stone episodes in her medical history.    Review of Systems  Pertinent items are noted in HPI, all other systems reviewed and negative     History  Past Medical History:   Diagnosis Date   • Colon polyp    • Kidney stone        Past Surgical History:   Procedure Laterality Date   • COLONOSCOPY  08/31/2005    hyperplastic polyp, enlarged ileocecal valve   • COLONOSCOPY N/A 12/6/2018    Procedure: COLONOSCOPY WITH ANESTHESIA;  Surgeon: Kwame Layne MD;  Location: [facility] ENDOSCOPY;  Service: Gastroenterology   • ENDOSCOPY N/A 12/6/2018    Procedure: ESOPHAGOGASTRODUODENOSCOPY WITH ANESTHESIA;  Surgeon: Kwame Layne MD;  Location: [facility] ENDOSCOPY;  Service: Gastroenterology   • HYSTERECTOMY      MILA BSO for fibroids   • TONSILLECTOMY     • WISDOM TOOTH EXTRACTION         Family History   Problem Relation Age of Onset   • Colon polyps Father    • Breast cancer Mother    • Ovarian cancer Neg Hx    • Colon cancer Neg Hx    • Uterine cancer Neg Hx        Social History     Socioeconomic History   • Marital status:      Spouse name: Not on file   • Number of children: Not on file   • Years of education: Not on file   • Highest education level: Not on file   Tobacco Use   • Smoking status: Never Smoker   • Smokeless " tobacco: Never Used   Substance and Sexual Activity   • Alcohol use: Yes     Comment: Occasional   • Drug use: No   • Sexual activity: Never     Birth control/protection: Post-menopausal       Current Facility-Administered Medications   Medication Dose Route Frequency Provider Last Rate Last Admin   • sodium chloride 0.9 % flush 10 mL  10 mL Intravenous PRN Ev Singh, CRISTHIAN            No Known Allergies    Objective     Vital Signs   Temp:  [97.8 °F (36.6 °C)-98 °F (36.7 °C)] 97.8 °F (36.6 °C)  Heart Rate:  [62-72] 62  Resp:  [16] 16  BP: (116-135)/(66-67) 116/67  No intake or output data in the 24 hours ending 02/16/21 1631    Physical Exam:    General: Alert, cooperative, nontoxic, uncomfortable appearing  Head:  Normocephalic, without obvious abnormality, atraumatic  Eyes:   Lids and lashes normal, no icterus, no pallor  Ears:  Ears appear intact with no abnormalities noted  Neck:  Supple  Back:  No costovertebral angle tenderness  Lungs: Unlabored respirations  Heart:  Regular rhythm and normal rate  Abdomen: Soft, non-tender, non-distended  :  Deferred  Extremities: Moves all extremities well  Skin:  Warm and dry  Neurologic: Cranial nerves 2 - 12 grossly intact    Data Review:    CT Abdomen Pelvis Without Contrast (02/16/2021 15:03)   XR Chest 1 View (02/16/2021 14:53)   XR Abdomen KUB (02/16/2021 14:04)   COVID-19, ABBOTT IN-HOUSE,NASAL Swab (NO TRANSPORT MEDIA) 2 HR TAT - Swab, Nasopharynx (02/16/2021 14:10)   Lactic Acid, Plasma (02/16/2021 14:03)   Comprehensive Metabolic Panel (02/16/2021 14:03)   CBC & Differential (02/16/2021 14:03)   Urinalysis, Microscopic Only - Urine, Clean Catch (02/16/2021 13:52)   Urinalysis With Culture If Indicated - Urine, Clean Catch (02/16/2021 13:52)   LACTIC ACID, PLASMA (02/13/2021 22:43)     I have independently reviewed the images of today's KUB and CT abdomen.    Assessment and Plan:    Left proximal ureteral stone with intractable colic and nausea: We  discussed continued medical expulsive therapy versus surgical intervention.  The patient would like to proceed with surgical intervention.  Her oral pain management at home for medical expulsive therapy has been unsuccessful.  We discussed cystoscopy with left retrograde pyelogram and left stent placement with possible left ureteroscopy and laser lithotripsy.  We discussed that we may only be able to place a stent today.  If that is the case, the patient understands that she would need a subsequent surgery either extracorporal shockwave lithotripsy or ureteroscopy with laser lithotripsy.  We discussed the risks and benefits including bleeding, infection, injury to the urinary tract, failure of stent placement, need for another surgery, etc.  I will order ofloxacin on-call to the OR.  Has been n.p.o. since 9 AM.  I have notified the operating room staff.  She will be admitted to observation in the meantime.    URO DISPO: To the operating room for the above surgery.    I discussed the patients findings and my recommendations with patient, family and nursing staff    (Please note that portions of this note were completed with a voice recognition program.)    Kali Michaels MD  02/16/21  16:31 CST    Time: Time spent: 40 minutes spent performing evaluation and management, chart review, and discussion with patient, > 50% of time spent in face-to-face encounter

## 2021-02-17 ENCOUNTER — PREP FOR SURGERY (OUTPATIENT)
Dept: OTHER | Facility: HOSPITAL | Age: 71
End: 2021-02-17

## 2021-02-17 VITALS
OXYGEN SATURATION: 95 % | HEART RATE: 57 BPM | HEIGHT: 64 IN | SYSTOLIC BLOOD PRESSURE: 121 MMHG | DIASTOLIC BLOOD PRESSURE: 54 MMHG | TEMPERATURE: 98.2 F | WEIGHT: 185 LBS | BODY MASS INDEX: 31.58 KG/M2 | RESPIRATION RATE: 16 BRPM

## 2021-02-17 DIAGNOSIS — N20.1 URETERAL STONE: Primary | ICD-10-CM

## 2021-02-17 LAB
ANION GAP SERPL CALCULATED.3IONS-SCNC: 9 MMOL/L (ref 5–15)
BUN SERPL-MCNC: 13 MG/DL (ref 8–23)
BUN/CREAT SERPL: 19.4 (ref 7–25)
CALCIUM SPEC-SCNC: 9.4 MG/DL (ref 8.6–10.5)
CHLORIDE SERPL-SCNC: 104 MMOL/L (ref 98–107)
CO2 SERPL-SCNC: 26 MMOL/L (ref 22–29)
CREAT SERPL-MCNC: 0.67 MG/DL (ref 0.57–1)
DEPRECATED RDW RBC AUTO: 44.2 FL (ref 37–54)
ERYTHROCYTE [DISTWIDTH] IN BLOOD BY AUTOMATED COUNT: 12.5 % (ref 12.3–15.4)
GFR SERPL CREATININE-BSD FRML MDRD: 87 ML/MIN/1.73
GLUCOSE SERPL-MCNC: 139 MG/DL (ref 65–99)
HCT VFR BLD AUTO: 40.4 % (ref 34–46.6)
HGB BLD-MCNC: 13 G/DL (ref 12–15.9)
MCH RBC QN AUTO: 30.9 PG (ref 26.6–33)
MCHC RBC AUTO-ENTMCNC: 32.2 G/DL (ref 31.5–35.7)
MCV RBC AUTO: 96 FL (ref 79–97)
PLATELET # BLD AUTO: 216 10*3/MM3 (ref 140–450)
PMV BLD AUTO: 10 FL (ref 6–12)
POTASSIUM SERPL-SCNC: 4.2 MMOL/L (ref 3.5–5.2)
QT INTERVAL: 428 MS
QTC INTERVAL: 420 MS
RBC # BLD AUTO: 4.21 10*6/MM3 (ref 3.77–5.28)
SODIUM SERPL-SCNC: 139 MMOL/L (ref 136–145)
WBC # BLD AUTO: 7.71 10*3/MM3 (ref 3.4–10.8)

## 2021-02-17 PROCEDURE — G0378 HOSPITAL OBSERVATION PER HR: HCPCS

## 2021-02-17 PROCEDURE — 80048 BASIC METABOLIC PNL TOTAL CA: CPT | Performed by: UROLOGY

## 2021-02-17 PROCEDURE — 85027 COMPLETE CBC AUTOMATED: CPT | Performed by: UROLOGY

## 2021-02-17 PROCEDURE — 99217 PR OBSERVATION CARE DISCHARGE MANAGEMENT: CPT | Performed by: UROLOGY

## 2021-02-17 RX ORDER — SODIUM CHLORIDE 0.9 % (FLUSH) 0.9 %
3 SYRINGE (ML) INJECTION EVERY 12 HOURS SCHEDULED
Status: CANCELLED | OUTPATIENT
Start: 2021-02-17

## 2021-02-17 RX ORDER — ONDANSETRON 2 MG/ML
4 INJECTION INTRAMUSCULAR; INTRAVENOUS EVERY 6 HOURS PRN
Status: CANCELLED | OUTPATIENT
Start: 2021-02-17

## 2021-02-17 RX ORDER — SODIUM CHLORIDE 0.9 % (FLUSH) 0.9 %
10 SYRINGE (ML) INJECTION AS NEEDED
Status: CANCELLED | OUTPATIENT
Start: 2021-02-17

## 2021-02-17 RX ORDER — LEVOFLOXACIN 5 MG/ML
500 INJECTION, SOLUTION INTRAVENOUS ONCE
Status: CANCELLED | OUTPATIENT
Start: 2021-02-17 | End: 2021-02-17

## 2021-02-17 RX ADMIN — PHENAZOPYRIDINE HYDROCHLORIDE 100 MG: 100 TABLET ORAL at 09:36

## 2021-02-17 RX ADMIN — SODIUM CHLORIDE, PRESERVATIVE FREE 3 ML: 5 INJECTION INTRAVENOUS at 09:36

## 2021-02-17 NOTE — ANESTHESIA POSTPROCEDURE EVALUATION
Patient: Deanna Tsai    Procedure Summary     Date: 02/16/21 Room / Location:  [facility] OR 01 / [facility] OR    Anesthesia Start: 1700 Anesthesia Stop: 1753    Procedure: CYSTOSCOPY RETROGRADE AND STENT INSERTION (Left Bladder) Diagnosis:     Surgeon: Kali Michaels MD Provider: Erwin Jimenez CRNA    Anesthesia Type: general ASA Status: 2 - Emergent          Anesthesia Type: general    Vitals  Vitals Value Taken Time   /50 02/16/21 1823   Temp 97 °F (36.1 °C) 02/16/21 1751   Pulse 56 02/16/21 1823   Resp 14 02/16/21 1820   SpO2 93 % 02/16/21 1823   Vitals shown include unvalidated device data.        Post Anesthesia Care and Evaluation    Patient location during evaluation: PACU  Patient participation: complete - patient participated  Level of consciousness: awake and alert  Pain score: 0  Pain management: adequate  Airway patency: patent  Anesthetic complications: No anesthetic complications  PONV Status: none  Cardiovascular status: acceptable and stable  Respiratory status: acceptable  Hydration status: acceptable

## 2021-02-17 NOTE — PROGRESS NOTES
Doing well after stent placement yesterday.  Comfortable.  Denies fevers, chills and rigors.  Tolerating stent.  Still having some gross hematuria but this has been improving.    Plan for discharge today with return to the OR in 2 weeks.

## 2021-02-17 NOTE — DISCHARGE SUMMARY
Date of Discharge:  2/17/2021    Discharge Diagnosis: Left ureteral stone    Presenting Problem/History of Present Illness  Ureteral stone [N20.1]  Ureteral stone [N20.1]     Same    Hospital Course  See notes    Procedures Performed  Procedure(s):  CYSTOSCOPY RETROGRADE AND STENT INSERTION       Consults:   Consults     No orders found for last 30 day(s).          Condition on Discharge: Stable    Vital Signs  Temp:  [97 °F (36.1 °C)-98.3 °F (36.8 °C)] 98.2 °F (36.8 °C)  Heart Rate:  [57-88] 57  Resp:  [14-16] 16  BP: (101-135)/(50-82) 121/54    Discharge Disposition  Home or Self Care    Discharge Medications     Discharge Medications      Changes to Medications      Instructions Start Date   fluconazole 100 MG tablet  Commonly known as: Diflucan  What changed: additional instructions   1 po x 1      phenazopyridine 200 MG tablet  Commonly known as: Pyridium  What changed:   · when to take this  · reasons to take this   1 po tid x 3 days         Continue These Medications      Instructions Start Date   conjugated estrogens 0.625 MG/GM vaginal cream  Commonly known as: Premarin   Vaginal, 2 Times Weekly      multivitamin with minerals tablet tablet   Oral, 2 Times Daily      polyethylene glycol-electrolytes 420 g solution  Commonly known as: Nulytely with Flavor Packs   4,000 mL, Oral, See Admin Instructions             Discharge Diet: Resume home diet.  Continue adequate hydration.    Activity at Discharge: Light duty.    Follow-up Appointments  No future appointments.      Test Results Pending at Discharge       Kali Michaels MD  02/17/21  10:01 CST    Time: Discharge 25 min

## 2021-02-18 ENCOUNTER — TELEPHONE (OUTPATIENT)
Dept: INTERNAL MEDICINE | Age: 71
End: 2021-02-18

## 2021-02-18 ENCOUNTER — TELEPHONE (OUTPATIENT)
Dept: UROLOGY | Facility: CLINIC | Age: 71
End: 2021-02-18

## 2021-02-18 NOTE — TELEPHONE ENCOUNTER
[facility] Trans[facility] Initial Follow Up Call    Outreach made within 2 business days of discharge: Yes    Patient: Rommel Hidalgo   Patient : 1950       MRN: [id number]    Reason for Admission: Admitted 21 for flank pain   Discharge Date: 21    Discharge Diagnosis:   Left ureteral stone     Spoke with: attempted to reach patient, no answer. Message left with intent of my call. I will reach out again next business day.      Discharge department/facility: [facility]     Scheduled appointment with PCP within 7-14 days    Follow Up  Future Appointments   Date Time Provider Balta Santiago   2021  1:00 PM [facility] MAMMO RM 2 [facility] WOMENS [facility] Women's   2021  1:30 PM Romayne Ireland, PA-C N LPS Gen SG [facility]       [place], [place]

## 2021-02-18 NOTE — TELEPHONE ENCOUNTER
Called patient with surgery info scheduled 03-04-21. Arrive at 0500 on 03-04-21. NPO after MN. Pre op done 02-16-21. All questions answered and she voiced understanding

## 2021-02-19 ENCOUNTER — TELEPHONE (OUTPATIENT)
Dept: INTERNAL MEDICINE | Age: 71
End: 2021-02-19

## 2021-02-19 NOTE — TELEPHONE ENCOUNTER
[facility] Transitions Initial Follow Up Call    Outreach made within 2 business days of discharge: Yes    Patient: Eric Lorenzo   Patient : 1950       MRN: [id number]     Reason for Admission: Admitted 21 for flank pain   Discharge Date: 21    Discharge Diagnosis:   Left ureteral stone                Spoke with: attempted to reach patient on both her phone and her 's phone, no answer.  Message left with intent of my call.      Discharge department/facility: [facility]    Follow Up  Future Appointments   Date Time Provider Balta Santiago   2021  1:00 PM L MAMMO RM 2 [facility] WOMENS L Women's   2021  1:30 PM MARII Wylie LPS Gen SG [facility]       [place]uel [place], [place]

## 2021-02-22 ENCOUNTER — OFFICE VISIT (OUTPATIENT)
Dept: INTERNAL MEDICINE | Age: 71
End: 2021-02-22
Payer: MEDICARE

## 2021-02-22 ENCOUNTER — TELEPHONE (OUTPATIENT)
Dept: INTERNAL MEDICINE | Age: 71
End: 2021-02-22

## 2021-02-22 VITALS
WEIGHT: 188 LBS | HEIGHT: 64 IN | HEART RATE: 66 BPM | DIASTOLIC BLOOD PRESSURE: 80 MMHG | SYSTOLIC BLOOD PRESSURE: 110 MMHG | RESPIRATION RATE: 18 BRPM | BODY MASS INDEX: 32.1 KG/M2 | OXYGEN SATURATION: 97 %

## 2021-02-22 DIAGNOSIS — R91.1 NODULE OF RIGHT LUNG: ICD-10-CM

## 2021-02-22 DIAGNOSIS — N23 RENAL COLIC ON LEFT SIDE: ICD-10-CM

## 2021-02-22 DIAGNOSIS — N13.30 HYDRONEPHROSIS, LEFT: Primary | ICD-10-CM

## 2021-02-22 DIAGNOSIS — K76.9 LIVER LESION: ICD-10-CM

## 2021-02-22 PROCEDURE — 99496 TRANSJ CARE MGMT HIGH F2F 7D: CPT | Performed by: INTERNAL MEDICINE

## 2021-02-22 ASSESSMENT — PATIENT HEALTH QUESTIONNAIRE - PHQ9
SUM OF ALL RESPONSES TO PHQ QUESTIONS 1-9: 0
SUM OF ALL RESPONSES TO PHQ9 QUESTIONS 1 & 2: 0
SUM OF ALL RESPONSES TO PHQ QUESTIONS 1-9: 0
2. FEELING DOWN, DEPRESSED OR HOPELESS: 0
1. LITTLE INTEREST OR PLEASURE IN DOING THINGS: 0

## 2021-02-22 ASSESSMENT — ENCOUNTER SYMPTOMS
CHEST TIGHTNESS: 0
SORE THROAT: 0
WHEEZING: 0
ABDOMINAL PAIN: 0
COUGH: 0
CONSTIPATION: 0

## 2021-02-22 NOTE — TELEPHONE ENCOUNTER
Pt said Dr Cherelle Charles office agreed with Dr Elyse Pierson on  taking the Flomax till her procedure.  This is a FYI

## 2021-02-22 NOTE — PROGRESS NOTES
Size: Large Adult   Pulse: 66   Resp: 18   SpO2: 97%   Weight: 188 lb (85.3 kg)   Height: 5' 4\" (1.626 m)     Body mass index is 32.27 kg/m². Physical Exam  Constitutional:       Appearance: She is well-developed. HENT:      Right Ear: External ear normal.      Left Ear: External ear normal.      Mouth/Throat:      Pharynx: No oropharyngeal exudate. Eyes:      Conjunctiva/sclera: Conjunctivae normal.      Pupils: Pupils are equal, round, and reactive to light. Neck:      Musculoskeletal: Neck supple. Thyroid: No thyromegaly. Vascular: No JVD. Cardiovascular:      Rate and Rhythm: Normal rate. Heart sounds: Normal heart sounds. No murmur. Pulmonary:      Effort: No respiratory distress. Breath sounds: Normal breath sounds. No wheezing or rales. Chest:      Chest wall: No tenderness. Abdominal:      General: Bowel sounds are normal.      Palpations: Abdomen is soft. Musculoskeletal: Normal range of motion. Lymphadenopathy:      Cervical: No cervical adenopathy. Skin:     General: Skin is warm. Findings: No rash. Neurological:      Mental Status: She is oriented to person, place, and time.          Lab Review   Admission on 02/13/2021, Discharged on 02/14/2021   Component Date Value    WBC 02/13/2021 11.0*    RBC 02/13/2021 4.37     Hemoglobin 02/13/2021 13.4     Hematocrit 02/13/2021 41.2     MCV 02/13/2021 94.3     MCH 02/13/2021 30.7     MCHC 02/13/2021 32.5*    RDW 02/13/2021 12.6     Platelets 36/69/2316 224     MPV 02/13/2021 10.2     Neutrophils % 02/13/2021 87.3*    Lymphocytes % 02/13/2021 7.5*    Monocytes % 02/13/2021 4.2     Eosinophils % 02/13/2021 0.2     Basophils % 02/13/2021 0.4     Neutrophils Absolute 02/13/2021 9.6*    Immature Granulocytes # 02/13/2021 0.0     Lymphocytes Absolute 02/13/2021 0.8*    Monocytes Absolute 02/13/2021 0.50     Eosinophils Absolute 02/13/2021 0.00     Basophils Absolute 02/13/2021 0.00     Sodium 02/13/2021 142     Potassium reflex Magnesi* 02/13/2021 4.3     Chloride 02/13/2021 106     CO2 02/13/2021 23     Anion Gap 02/13/2021 13     Glucose 02/13/2021 118*    BUN 02/13/2021 23     CREATININE 02/13/2021 1.0*    GFR Non- 02/13/2021 55*    GFR  02/13/2021 >59     Calcium 02/13/2021 9.5     Total Protein 02/13/2021 7.1     Albumin 02/13/2021 4.3     Total Bilirubin 02/13/2021 0.5     Alkaline Phosphatase 02/13/2021 77     ALT 02/13/2021 15     AST 02/13/2021 14     Lipase 02/13/2021 31     Color, UA 02/13/2021 YELLOW     Clarity, UA 02/13/2021 Clear     Glucose, Ur 02/13/2021 Negative     Bilirubin Urine 02/13/2021 Negative     Ketones, Urine 02/13/2021 15*    Specific Gravity, UA 02/13/2021 >=1.045     Blood, Urine 02/13/2021 LARGE*    pH, UA 02/13/2021 6.5     Protein, UA 02/13/2021 TRACE*    Urobilinogen, Urine 02/13/2021 1.0     Nitrite, Urine 02/13/2021 Negative     Leukocyte Esterase, Urine 02/13/2021 TRACE*    Lactic Acid 02/13/2021 1.0     P-R Interval 02/13/2021 160     QRS Duration 02/13/2021 86     Q-T Interval 02/13/2021 414     QTc Calculation (Bazett) 02/13/2021 411     P Axis 02/13/2021 35     T Axis 02/13/2021 21     Bacteria, UA 02/13/2021 NEGATIVE*    Crystals, UA 02/13/2021 NEG*    Hyaline Casts, UA 02/13/2021 0     WBC, UA 02/13/2021 2     RBC, UA 02/13/2021 414*    Epithelial Cells, UA 02/13/2021 1    Orders Only on 01/12/2021   Component Date Value    Color, UA 01/12/2021 YELLOW     Clarity, UA 01/12/2021 Clear     Glucose, Ur 01/12/2021 Negative     Bilirubin Urine 01/12/2021 Negative     Ketones, Urine 01/12/2021 Negative     Specific Gravity, UA 01/12/2021 1.026     Blood, Urine 01/12/2021 LARGE*    pH, UA 01/12/2021 7.0     Protein, UA 01/12/2021 TRACE*    Urobilinogen, Urine 01/12/2021 1.0     Nitrite, Urine 01/12/2021 Negative     Leukocyte Esterase, Urine 01/12/2021 TRACE*    Bacteria, UA 01/12/2021 NEGATIVE*    Crystals, UA 01/12/2021 NEG*    Hyaline Casts, UA 01/12/2021 1     WBC, UA 01/12/2021 2     RBC, UA 01/12/2021 601*    Epithelial Cells, UA 01/12/2021 4            ASSESSMENT/PLAN:    Hydronephrosis, left  Renal colic on left side    1. Mild obstructive uropathy on the left with hydronephrosis and an   obstructing ovoid stone at the left UPJ, which measures approximately   9.4 mm. Patient is post retrograde cystoscopy and stent placement  Should remain on Flomax  Has appointment for lithotripsy next week    Liver lesion  Incidental finding on CT scan  Obtain  -     US LIVER; Future    Nodule of right lung  4. 5 mm subpleural nodule in the right lower lobe laterally which is   most likely benign.     -     CT CHEST W CONTRAST; Future for July 2021    Other orders  -     zoster recombinant adjuvanted vaccine [facility]) 50 MCG/0.5ML SUSR injection; Inject 0.5 mLs into the muscle once for 1 dose Followed by 0.5 ml IM 26- months after dose 1      Diagnostic test results reviewed    Risk for morbidity and mortality low  This is high complexity TCM visit  Patient was called within 2 business days of discharge    Family able to provide care to patient at home  No other needs including PT, OT needs detected at this time   additional provider follow-ups needed at this time-urology as per [facility] urology          Orders Placed This Encounter   Procedures    US LIVER    CT CHEST W CONTRAST     New Prescriptions    ZOSTER RECOMBINANT ADJUVANTED VACCINE (SHINGRIX) 50 MCG/0.5ML SUSR INJECTION    Inject 0.5 mLs into the muscle once for 1 dose Followed by 0.5 ml IM 26- months after dose 1         No follow-ups on file. There are no Patient Instructions on file for this visit. EMR Dragon/transcription disclaimer:Significant part of this  encounter note is electronic transcription/translationof spoken language to printed text.  The electronic translation of spoken language may be erroneous, or at times, nonsensical words or phrases may be inadvertently transcribed.  Although I have reviewed the note for sucherrors, some may still exist.

## 2021-02-24 ENCOUNTER — TRANSCRIBE ORDERS (OUTPATIENT)
Dept: ADMINISTRATIVE | Facility: HOSPITAL | Age: 71
End: 2021-02-24

## 2021-02-24 DIAGNOSIS — Z11.59 SCREENING FOR VIRAL DISEASE: Primary | ICD-10-CM

## 2021-03-01 ENCOUNTER — LAB (OUTPATIENT)
Dept: LAB | Facility: HOSPITAL | Age: 71
End: 2021-03-01

## 2021-03-01 LAB — SARS-COV-2 ORF1AB RESP QL NAA+PROBE: NOT DETECTED

## 2021-03-01 PROCEDURE — C9803 HOPD COVID-19 SPEC COLLECT: HCPCS | Performed by: UROLOGY

## 2021-03-01 PROCEDURE — U0005 INFEC AGEN DETEC AMPLI PROBE: HCPCS | Performed by: UROLOGY

## 2021-03-01 PROCEDURE — U0004 COV-19 TEST NON-CDC HGH THRU: HCPCS | Performed by: UROLOGY

## 2021-03-04 ENCOUNTER — HOSPITAL ENCOUNTER (OUTPATIENT)
Facility: HOSPITAL | Age: 71
Setting detail: HOSPITAL OUTPATIENT SURGERY
Discharge: HOME | End: 2021-03-04
Attending: UROLOGY | Admitting: UROLOGY

## 2021-03-04 ENCOUNTER — ANESTHESIA (OUTPATIENT)
Dept: PERIOP | Facility: HOSPITAL | Age: 71
End: 2021-03-04

## 2021-03-04 ENCOUNTER — ANESTHESIA EVENT (OUTPATIENT)
Dept: PERIOP | Facility: HOSPITAL | Age: 71
End: 2021-03-04

## 2021-03-04 ENCOUNTER — APPOINTMENT (OUTPATIENT)
Dept: GENERAL RADIOLOGY | Facility: HOSPITAL | Age: 71
End: 2021-03-04

## 2021-03-04 VITALS
TEMPERATURE: 97.2 F | BODY MASS INDEX: 32.03 KG/M2 | HEART RATE: 58 BPM | RESPIRATION RATE: 16 BRPM | SYSTOLIC BLOOD PRESSURE: 117 MMHG | DIASTOLIC BLOOD PRESSURE: 70 MMHG | HEIGHT: 64 IN | WEIGHT: 187.6 LBS | OXYGEN SATURATION: 94 %

## 2021-03-04 DIAGNOSIS — N20.1 URETERAL STONE: ICD-10-CM

## 2021-03-04 PROCEDURE — 25010000002 DEXAMETHASONE PER 1 MG: Performed by: NURSE ANESTHETIST, CERTIFIED REGISTERED

## 2021-03-04 PROCEDURE — 25010000002 ONDANSETRON PER 1 MG: Performed by: NURSE ANESTHETIST, CERTIFIED REGISTERED

## 2021-03-04 PROCEDURE — 25010000002 MIDAZOLAM PER 1 MG: Performed by: ANESTHESIOLOGY

## 2021-03-04 PROCEDURE — 74420 UROGRAPHY RTRGR +-KUB: CPT

## 2021-03-04 PROCEDURE — 25010000002 FENTANYL CITRATE (PF) 100 MCG/2ML SOLUTION: Performed by: NURSE ANESTHETIST, CERTIFIED REGISTERED

## 2021-03-04 PROCEDURE — C1769 GUIDE WIRE: HCPCS | Performed by: UROLOGY

## 2021-03-04 PROCEDURE — 25010000002 DEXAMETHASONE PER 1 MG: Performed by: ANESTHESIOLOGY

## 2021-03-04 PROCEDURE — C1894 INTRO/SHEATH, NON-LASER: HCPCS | Performed by: UROLOGY

## 2021-03-04 PROCEDURE — C1758 CATHETER, URETERAL: HCPCS | Performed by: UROLOGY

## 2021-03-04 PROCEDURE — 25010000002 IOPAMIDOL 61 % SOLUTION: Performed by: UROLOGY

## 2021-03-04 PROCEDURE — 74420 UROGRAPHY RTRGR +-KUB: CPT | Performed by: UROLOGY

## 2021-03-04 PROCEDURE — 25010000002 LEVOFLOXACIN PER 250 MG: Performed by: UROLOGY

## 2021-03-04 PROCEDURE — 25010000002 KETOROLAC TROMETHAMINE PER 15 MG: Performed by: NURSE ANESTHETIST, CERTIFIED REGISTERED

## 2021-03-04 PROCEDURE — 52356 CYSTO/URETERO W/LITHOTRIPSY: CPT | Performed by: UROLOGY

## 2021-03-04 PROCEDURE — C2617 STENT, NON-COR, TEM W/O DEL: HCPCS | Performed by: UROLOGY

## 2021-03-04 PROCEDURE — 25010000002 PROPOFOL 10 MG/ML EMULSION: Performed by: NURSE ANESTHETIST, CERTIFIED REGISTERED

## 2021-03-04 DEVICE — URETERAL STENT
Type: IMPLANTABLE DEVICE | Site: URETER | Status: FUNCTIONAL
Brand: PERCUFLEX™ PLUS

## 2021-03-04 RX ORDER — OXYCODONE AND ACETAMINOPHEN 7.5; 325 MG/1; MG/1
1 TABLET ORAL EVERY 6 HOURS PRN
Qty: 8 TABLET | Refills: 0 | Status: SHIPPED | OUTPATIENT
Start: 2021-03-04 | End: 2021-12-06

## 2021-03-04 RX ORDER — SODIUM CHLORIDE 0.9 % (FLUSH) 0.9 %
3-10 SYRINGE (ML) INJECTION AS NEEDED
Status: DISCONTINUED | OUTPATIENT
Start: 2021-03-04 | End: 2021-03-04 | Stop reason: HOSPADM

## 2021-03-04 RX ORDER — FLUMAZENIL 0.1 MG/ML
0.2 INJECTION INTRAVENOUS AS NEEDED
Status: DISCONTINUED | OUTPATIENT
Start: 2021-03-04 | End: 2021-03-04 | Stop reason: HOSPADM

## 2021-03-04 RX ORDER — TAMSULOSIN HYDROCHLORIDE 0.4 MG/1
1 CAPSULE ORAL DAILY
Qty: 90 CAPSULE | Refills: 0 | Status: SHIPPED | OUTPATIENT
Start: 2021-03-04 | End: 2021-12-06

## 2021-03-04 RX ORDER — MIDAZOLAM HYDROCHLORIDE 1 MG/ML
0.5 INJECTION INTRAMUSCULAR; INTRAVENOUS
Status: DISCONTINUED | OUTPATIENT
Start: 2021-03-04 | End: 2021-03-04 | Stop reason: HOSPADM

## 2021-03-04 RX ORDER — SODIUM CHLORIDE 0.9 % (FLUSH) 0.9 %
3 SYRINGE (ML) INJECTION AS NEEDED
Status: DISCONTINUED | OUTPATIENT
Start: 2021-03-04 | End: 2021-03-04 | Stop reason: HOSPADM

## 2021-03-04 RX ORDER — NALOXONE HCL 0.4 MG/ML
0.4 VIAL (ML) INJECTION AS NEEDED
Status: DISCONTINUED | OUTPATIENT
Start: 2021-03-04 | End: 2021-03-04 | Stop reason: HOSPADM

## 2021-03-04 RX ORDER — IBUPROFEN 600 MG/1
600 TABLET ORAL ONCE AS NEEDED
Status: DISCONTINUED | OUTPATIENT
Start: 2021-03-04 | End: 2021-03-04 | Stop reason: HOSPADM

## 2021-03-04 RX ORDER — FENTANYL CITRATE 50 UG/ML
INJECTION, SOLUTION INTRAMUSCULAR; INTRAVENOUS AS NEEDED
Status: DISCONTINUED | OUTPATIENT
Start: 2021-03-04 | End: 2021-03-04 | Stop reason: SURG

## 2021-03-04 RX ORDER — LIDOCAINE HYDROCHLORIDE 20 MG/ML
INJECTION, SOLUTION EPIDURAL; INFILTRATION; INTRACAUDAL; PERINEURAL AS NEEDED
Status: DISCONTINUED | OUTPATIENT
Start: 2021-03-04 | End: 2021-03-04 | Stop reason: SURG

## 2021-03-04 RX ORDER — SODIUM CHLORIDE 0.9 % (FLUSH) 0.9 %
3 SYRINGE (ML) INJECTION EVERY 12 HOURS SCHEDULED
Status: DISCONTINUED | OUTPATIENT
Start: 2021-03-04 | End: 2021-03-04 | Stop reason: HOSPADM

## 2021-03-04 RX ORDER — ONDANSETRON 2 MG/ML
4 INJECTION INTRAMUSCULAR; INTRAVENOUS ONCE AS NEEDED
Status: DISCONTINUED | OUTPATIENT
Start: 2021-03-04 | End: 2021-03-04 | Stop reason: HOSPADM

## 2021-03-04 RX ORDER — EPHEDRINE SULFATE 50 MG/ML
INJECTION, SOLUTION INTRAVENOUS AS NEEDED
Status: DISCONTINUED | OUTPATIENT
Start: 2021-03-04 | End: 2021-03-04 | Stop reason: SURG

## 2021-03-04 RX ORDER — LABETALOL HYDROCHLORIDE 5 MG/ML
5 INJECTION, SOLUTION INTRAVENOUS
Status: DISCONTINUED | OUTPATIENT
Start: 2021-03-04 | End: 2021-03-04 | Stop reason: HOSPADM

## 2021-03-04 RX ORDER — ONDANSETRON 2 MG/ML
4 INJECTION INTRAMUSCULAR; INTRAVENOUS EVERY 6 HOURS PRN
Status: DISCONTINUED | OUTPATIENT
Start: 2021-03-04 | End: 2021-03-04 | Stop reason: HOSPADM

## 2021-03-04 RX ORDER — LIDOCAINE HYDROCHLORIDE 10 MG/ML
0.5 INJECTION, SOLUTION EPIDURAL; INFILTRATION; INTRACAUDAL; PERINEURAL ONCE AS NEEDED
Status: DISCONTINUED | OUTPATIENT
Start: 2021-03-04 | End: 2021-03-04 | Stop reason: HOSPADM

## 2021-03-04 RX ORDER — DEXAMETHASONE SODIUM PHOSPHATE 4 MG/ML
INJECTION, SOLUTION INTRA-ARTICULAR; INTRALESIONAL; INTRAMUSCULAR; INTRAVENOUS; SOFT TISSUE AS NEEDED
Status: DISCONTINUED | OUTPATIENT
Start: 2021-03-04 | End: 2021-03-04 | Stop reason: SURG

## 2021-03-04 RX ORDER — LEVOFLOXACIN 5 MG/ML
500 INJECTION, SOLUTION INTRAVENOUS ONCE
Status: COMPLETED | OUTPATIENT
Start: 2021-03-04 | End: 2021-03-04

## 2021-03-04 RX ORDER — SODIUM CHLORIDE, SODIUM LACTATE, POTASSIUM CHLORIDE, CALCIUM CHLORIDE 600; 310; 30; 20 MG/100ML; MG/100ML; MG/100ML; MG/100ML
1000 INJECTION, SOLUTION INTRAVENOUS CONTINUOUS
Status: DISCONTINUED | OUTPATIENT
Start: 2021-03-04 | End: 2021-03-04 | Stop reason: HOSPADM

## 2021-03-04 RX ORDER — PROPOFOL 10 MG/ML
VIAL (ML) INTRAVENOUS AS NEEDED
Status: DISCONTINUED | OUTPATIENT
Start: 2021-03-04 | End: 2021-03-04 | Stop reason: SURG

## 2021-03-04 RX ORDER — KETOROLAC TROMETHAMINE 30 MG/ML
INJECTION, SOLUTION INTRAMUSCULAR; INTRAVENOUS AS NEEDED
Status: DISCONTINUED | OUTPATIENT
Start: 2021-03-04 | End: 2021-03-04 | Stop reason: SURG

## 2021-03-04 RX ORDER — OXYCODONE AND ACETAMINOPHEN 10; 325 MG/1; MG/1
1 TABLET ORAL ONCE AS NEEDED
Status: DISCONTINUED | OUTPATIENT
Start: 2021-03-04 | End: 2021-03-04 | Stop reason: HOSPADM

## 2021-03-04 RX ORDER — FAMOTIDINE 10 MG/ML
20 INJECTION, SOLUTION INTRAVENOUS
Status: COMPLETED | OUTPATIENT
Start: 2021-03-04 | End: 2021-03-04

## 2021-03-04 RX ORDER — ONDANSETRON 2 MG/ML
INJECTION INTRAMUSCULAR; INTRAVENOUS AS NEEDED
Status: DISCONTINUED | OUTPATIENT
Start: 2021-03-04 | End: 2021-03-04 | Stop reason: SURG

## 2021-03-04 RX ORDER — SODIUM CHLORIDE, SODIUM LACTATE, POTASSIUM CHLORIDE, CALCIUM CHLORIDE 600; 310; 30; 20 MG/100ML; MG/100ML; MG/100ML; MG/100ML
100 INJECTION, SOLUTION INTRAVENOUS CONTINUOUS
Status: DISCONTINUED | OUTPATIENT
Start: 2021-03-04 | End: 2021-03-04 | Stop reason: HOSPADM

## 2021-03-04 RX ORDER — MIDAZOLAM HYDROCHLORIDE 1 MG/ML
1 INJECTION INTRAMUSCULAR; INTRAVENOUS
Status: DISCONTINUED | OUTPATIENT
Start: 2021-03-04 | End: 2021-03-04 | Stop reason: HOSPADM

## 2021-03-04 RX ORDER — ACETAMINOPHEN 500 MG
1000 TABLET ORAL ONCE
Status: COMPLETED | OUTPATIENT
Start: 2021-03-04 | End: 2021-03-04

## 2021-03-04 RX ORDER — MAGNESIUM HYDROXIDE 1200 MG/15ML
LIQUID ORAL AS NEEDED
Status: DISCONTINUED | OUTPATIENT
Start: 2021-03-04 | End: 2021-03-04 | Stop reason: HOSPADM

## 2021-03-04 RX ORDER — FENTANYL CITRATE 50 UG/ML
25 INJECTION, SOLUTION INTRAMUSCULAR; INTRAVENOUS
Status: DISCONTINUED | OUTPATIENT
Start: 2021-03-04 | End: 2021-03-04 | Stop reason: HOSPADM

## 2021-03-04 RX ORDER — OXYCODONE AND ACETAMINOPHEN 7.5; 325 MG/1; MG/1
2 TABLET ORAL EVERY 4 HOURS PRN
Status: DISCONTINUED | OUTPATIENT
Start: 2021-03-04 | End: 2021-03-04 | Stop reason: HOSPADM

## 2021-03-04 RX ORDER — SODIUM CHLORIDE 0.9 % (FLUSH) 0.9 %
10 SYRINGE (ML) INJECTION AS NEEDED
Status: DISCONTINUED | OUTPATIENT
Start: 2021-03-04 | End: 2021-03-04 | Stop reason: HOSPADM

## 2021-03-04 RX ORDER — TAMSULOSIN HYDROCHLORIDE 0.4 MG/1
1 CAPSULE ORAL DAILY
Qty: 30 CAPSULE | Refills: 0 | Status: SHIPPED | OUTPATIENT
Start: 2021-03-04 | End: 2021-03-04

## 2021-03-04 RX ORDER — DEXAMETHASONE SODIUM PHOSPHATE 4 MG/ML
4 INJECTION, SOLUTION INTRA-ARTICULAR; INTRALESIONAL; INTRAMUSCULAR; INTRAVENOUS; SOFT TISSUE ONCE AS NEEDED
Status: COMPLETED | OUTPATIENT
Start: 2021-03-04 | End: 2021-03-04

## 2021-03-04 RX ADMIN — EPHEDRINE SULFATE 10 MG: 50 INJECTION INTRAVENOUS at 07:21

## 2021-03-04 RX ADMIN — FENTANYL CITRATE 50 MCG: 50 INJECTION, SOLUTION INTRAMUSCULAR; INTRAVENOUS at 07:14

## 2021-03-04 RX ADMIN — FAMOTIDINE 20 MG: 10 INJECTION INTRAVENOUS at 06:36

## 2021-03-04 RX ADMIN — PROPOFOL 90 MG: 10 INJECTION, EMULSION INTRAVENOUS at 07:02

## 2021-03-04 RX ADMIN — KETOROLAC TROMETHAMINE 30 MG: 30 INJECTION, SOLUTION INTRAMUSCULAR at 07:08

## 2021-03-04 RX ADMIN — ACETAMINOPHEN 1000 MG: 500 TABLET, FILM COATED ORAL at 06:36

## 2021-03-04 RX ADMIN — FENTANYL CITRATE 50 MCG: 50 INJECTION, SOLUTION INTRAMUSCULAR; INTRAVENOUS at 07:00

## 2021-03-04 RX ADMIN — LIDOCAINE HYDROCHLORIDE 100 MG: 20 INJECTION, SOLUTION EPIDURAL; INFILTRATION; INTRACAUDAL; PERINEURAL at 07:02

## 2021-03-04 RX ADMIN — LEVOFLOXACIN 500 MG: 5 INJECTION, SOLUTION INTRAVENOUS at 06:37

## 2021-03-04 RX ADMIN — PROPOFOL 30 MG: 10 INJECTION, EMULSION INTRAVENOUS at 07:14

## 2021-03-04 RX ADMIN — DEXAMETHASONE SODIUM PHOSPHATE 4 MG: 4 INJECTION, SOLUTION INTRAMUSCULAR; INTRAVENOUS at 06:36

## 2021-03-04 RX ADMIN — ONDANSETRON HYDROCHLORIDE 4 MG: 2 SOLUTION INTRAMUSCULAR; INTRAVENOUS at 07:28

## 2021-03-04 RX ADMIN — SODIUM CHLORIDE, POTASSIUM CHLORIDE, SODIUM LACTATE AND CALCIUM CHLORIDE 1000 ML: 600; 310; 30; 20 INJECTION, SOLUTION INTRAVENOUS at 06:17

## 2021-03-04 RX ADMIN — DEXAMETHASONE SODIUM PHOSPHATE 4 MG: 4 INJECTION, SOLUTION INTRAMUSCULAR; INTRAVENOUS at 07:15

## 2021-03-04 RX ADMIN — MIDAZOLAM 1 MG: 1 INJECTION INTRAMUSCULAR; INTRAVENOUS at 06:36

## 2021-03-04 NOTE — ANESTHESIA POSTPROCEDURE EVALUATION
"Patient: Deanna Tsai    Procedure Summary     Date: 03/04/21 Room / Location:  [facility] OR 01 /  [facility] OR    Anesthesia Start: 0700 Anesthesia Stop: 0738    Procedure: URETEROSCOPY LASER LITHOTRIPSY WITH STENT INSERTION, LEFT RETROGRADE PYELOGRAMS (Left Ureter) Diagnosis:       Ureteral stone      (Ureteral stone [N20.1])    Surgeon: Kali Michaels MD Provider: Macarena Morejon CRNA    Anesthesia Type: general ASA Status: 2          Anesthesia Type: general    Vitals  Vitals Value Taken Time   /77 03/04/21 0800   Temp 97.2 °F (36.2 °C) 03/04/21 0750   Pulse 69 03/04/21 0800   Resp 16 03/04/21 0800   SpO2 97 % 03/04/21 0800           Post Anesthesia Care and Evaluation    Patient location during evaluation: PACU  Patient participation: complete - patient participated  Level of consciousness: awake and alert  Pain management: adequate  Airway patency: patent  Anesthetic complications: No anesthetic complications    Cardiovascular status: acceptable  Respiratory status: acceptable  Hydration status: acceptable    Comments: Blood pressure 125/77, pulse 69, temperature 97.2 °F (36.2 °C), temperature source Temporal, resp. rate 16, height 162 cm (63.78\"), weight 85.1 kg (187 lb 9.6 oz), SpO2 97 %.    Pt discharged from PACU based on sarah score >8  No anesthesia care post op    "

## 2021-03-04 NOTE — ANESTHESIA PREPROCEDURE EVALUATION
Anesthesia Evaluation     Patient summary reviewed and Nursing notes reviewed   no history of anesthetic complications:  NPO Solid Status: > 8 hours  NPO Liquid Status: > 8 hours           Airway   Mallampati: I  TM distance: >3 FB  Neck ROM: full  No difficulty expected  Dental          Pulmonary      ROS comment: 12/1/20 positive covid, no symptoms  Cardiovascular   Exercise tolerance: good (4-7 METS)    (-) hypertension, CAD      Neuro/Psych  (-) seizures, TIA, CVA  GI/Hepatic/Renal/Endo    (+) obesity,  GERD,  renal disease stones,   (-) liver disease, diabetes    Musculoskeletal     Abdominal    Substance History      OB/GYN          Other                        Anesthesia Plan    ASA 2     general     intravenous induction     Anesthetic plan, all risks, benefits, and alternatives have been provided, discussed and informed consent has been obtained with: patient.

## 2021-03-04 NOTE — TELEPHONE ENCOUNTER
Requested Prescriptions     Pending Prescriptions Disp Refills   • tamsulosin (FLOMAX) 0.4 MG capsule 24 hr capsule [Pharmacy Med Name: TAMSULOSIN 0.4MG CAPSULES] 90 capsule      Sig: TAKE 1 CAPSULE BY MOUTH DAILY

## 2021-03-04 NOTE — OP NOTE
URETEROSCOPY LASER LITHOTRIPSY WITH STENT INSERTION  Procedure Note    Deanna Tsai  3/4/2021    Pre-op Diagnosis:   Left proximal ureteral stone    Post-op Diagnosis:     Left kidney stone, lower pole    Procedure(s):  1. Left stent removal  2. Performance and interpretation of a left retrograde pyelogram  3. Left ureteroscopy with laser lithotripsy and stent placement    Anesthesia: General    Staff:   Circulator: Ashleigh Lopez RN  Scrub Person: Juan Ayon; Deborah Patrick    Estimated Blood Loss: minimal    Specimens:                None      Drains:   Ureteral Drain/Stent Left ureter 6 Fr. (Active)       Findings: The large stone had been pushed back into the lower pole of the kidney, likely from stent placement.  Laser lithotripsy was performed and dusted the stone.  There were no significant fragments noted afterwards.  There was some clot within the kidney that made visibility somewhat difficult.    Complications: None    Indications: Previously stented large left proximal ureteral stone    Description of Procedure:     The patient was greeted in the holding area.  We discussed the procedure including risks, benefits, alternatives and complications.  All questions were answered.  Informed consent was obtained.    Preoperative antibiotic was administered.  The patient was transported back to the operating suite.  General anesthesia was set followed by airway management.  Patient was prepped and sterilely draped in dorsal lithotomy position.  A formal timeout was performed.    I proceeded with rigid cystourethroscopy.   fluoroscopy was performed noting the left stent in place.  Graspers were used to pull the stent out to the meatus.  A Glidewire was passed through the stent under fluoroscopic guidance and noted to curl within the kidney.  The stent was removed.  A 10 Thai dual-lumen catheter was placed over the Glidewire.  A retrograde pyelogram was performed.  Please see separate dictated  note.  A second Glidewire was placed through the 10 [place] dual-lumen catheter.  The catheter was removed leaving both wires in place.  A ureteral access sheath was placed over 1 of the Glidewire and advanced under fluoroscopic guidance.  Flexible ureteroscopy was performed.  The stone was visualized in the left lower pole.  Laser lithotripsy was performed.  The stone was dusted. There were no remaining stone fragments noted under direct vision or upon fluoroscopy.  The ureter was visualized in its entirety under direct visualization with the scope as the ureteral access sheath was removed.  There were no injuries.    The Glidewire was left in place and the scope was removed.  A 4.8 [place] by 24 cm stent was placed with a dangler string.  The bladder was emptied after the distal curl was noted in position using cystoscopy.  The scope was removed.  The dangler string was tucked into the vagina.    The  patient was awoken from the procedure and transported in stable condition to the PACU.    Disposition: The patient should remove the stent on Monday either at home or in my office.  She will then need to follow-up with me in 4 weeks with a limited renal ultrasound and KUB.  I will discharge her with tamsulosin and percocet.    Kali Michaesl MD     Date: 3/4/2021  Time: 07:42 CST

## 2021-03-04 NOTE — DISCHARGE INSTRUCTIONS

## 2021-03-04 NOTE — H&P
"Chief complaint: \" Kidney stone\"    Subjective     History of present illness:    The patient presents today for definitive therapy of her ureteral stone that was previously stented.  Stone was in the left proximal ureter.  She has some mild stent dysuria and frequency.  Gross hematuria has resolved.  She denies fevers chills and rigors.    Review of Systems  Pertinent items are noted in HPI, all other systems reviewed and negative     History  Past Medical History:   Diagnosis Date   • Colon polyp    • GERD (gastroesophageal reflux disease)    • Kidney stone    • Personal history of covid-19 11/2020    No s/s and/or any distress; pt believes was a false positive       Past Surgical History:   Procedure Laterality Date   • BREAST SURGERY     • COLONOSCOPY  08/31/2005    hyperplastic polyp, enlarged ileocecal valve   • COLONOSCOPY N/A 12/6/2018    Procedure: COLONOSCOPY WITH ANESTHESIA;  Surgeon: Kwame Layne MD;  Location: [facility] ENDOSCOPY;  Service: Gastroenterology   • CYSTOSCOPY, RETROGRADE PYELOGRAM AND STENT INSERTION Left 2/16/2021    Procedure: CYSTOSCOPY RETROGRADE AND STENT INSERTION;  Surgeon: Kali Michaels MD;  Location: [facility] OR;  Service: Urology;  Laterality: Left;   • ENDOSCOPY N/A 12/6/2018    Procedure: ESOPHAGOGASTRODUODENOSCOPY WITH ANESTHESIA;  Surgeon: Kwame Layne MD;  Location: [facility] ENDOSCOPY;  Service: Gastroenterology   • HYSTERECTOMY      MILA BSO for fibroids   • TONSILLECTOMY     • WISDOM TOOTH EXTRACTION         Family History   Problem Relation Age of Onset   • Colon polyps Father    • Breast cancer Mother    • Ovarian cancer Neg Hx    • Colon cancer Neg Hx    • Uterine cancer Neg Hx        Social History     Socioeconomic History   • Marital status:      Spouse name: Not on file   • Number of children: Not on file   • Years of education: Not on file   • Highest education level: Not on file   Tobacco Use   • Smoking status: Never Smoker   • Smokeless tobacco: " Never Used   Substance and Sexual Activity   • Alcohol use: Yes     Comment: Occasional   • Drug use: No   • Sexual activity: Defer       Current Facility-Administered Medications   Medication Dose Route Frequency Provider Last Rate Last Admin   • lactated ringers infusion 1,000 mL  1,000 mL Intravenous Continuous Kali Michaels MD 25 mL/hr at 03/04/21 0617 1,000 mL at 03/04/21 0617   • lactated ringers infusion  100 mL/hr Intravenous Continuous Nenita Lynch MD       • levoFLOXacin (LEVAQUIN) 500 mg/100 mL D5W (premix) (LEVAQUIN) 500 mg  500 mg Intravenous Once Kali Michaels MD   500 mg at 03/04/21 0637   • lidocaine PF 1% (XYLOCAINE) injection 0.5 mL  0.5 mL Intradermal Once PRN Kali Michaels MD       • lidocaine PF 1% (XYLOCAINE) injection 0.5 mL  0.5 mL Injection Once PRN Nenita Lynch MD       • midazolam (VERSED) injection 0.5 mg  0.5 mg Intravenous Q10 Min PRN Nenita Lynch MD        Or   • midazolam (VERSED) injection 1 mg  1 mg Intravenous Q10 Min PRN Nenita Lynch MD   1 mg at 03/04/21 0636   • ondansetron (ZOFRAN) injection 4 mg  4 mg Intravenous Q6H PRN Kali Michaels MD       • sodium chloride 0.9 % flush 10 mL  10 mL Intravenous PRN Kali Michaels MD       • sodium chloride 0.9 % flush 3 mL  3 mL Intravenous Q12H Kali Michaels MD       • sodium chloride 0.9 % flush 3 mL  3 mL Intravenous PRN Kali Michaels MD       • sodium chloride 0.9 % flush 3 mL  3 mL Intravenous Q12H Nenita Lynch MD       • sodium chloride 0.9 % flush 3-10 mL  3-10 mL Intravenous PRN Nenita Lynch MD            No Known Allergies    Objective     Vital Signs   Temp:  [97.4 °F (36.3 °C)] 97.4 °F (36.3 °C)  Heart Rate:  [70-74] 74  Resp:  [16] 16  BP: (136)/(73) 136/73  No intake or output data in the 24 hours ending 03/04/21 0623    Physical Exam:    General: Alert, cooperative, nontoxic, comfortable  Head:  Normocephalic, without obvious  abnormality, atraumatic  Eyes:   Lids and lashes normal, no icterus, no pallor  Ears:  Ears appear intact with no abnormalities noted  Neck:  Supple  Back:  No costovertebral angle tenderness  Lungs: Unlabored respirations  Heart:  Regular rhythm and normal rate  Abdomen: Soft, non-tender, non-distended  :  Deferred  Extremities: Moves all extremities well  Skin:  Warm and dry  Neurologic: Cranial nerves 2 - 12 grossly intact    Data Review:    CT Abdomen Pelvis Without Contrast (02/16/2021 15:03)   COVID PRE-OP / PRE-PROCEDURE SCREENING ORDER (NO ISOLATION) - Swab, Oropharynx (03/01/2021 09:31)       Assessment and Plan:    Left ureteral stone: Plan for cystoscopy, left retrograde pyelogram, insertion left ureteral stent, possible left ureteroscopy with laser lithotripsy and stone basket extraction, indicated procedures.  Levaquin ordered.  Discussed risk and benefits including bleeding, infection, injury to the urinary tract, failure of the procedure, need for another stent etc.  All questions were answered and the patient wishes to proceed.    URO DISPO: To the operating room for ureteroscopy    I discussed the patients findings and my recommendations with patient and nursing staff    (Please note that portions of this note were completed with a voice recognition program.)    Kali Michaels MD  03/04/21  06:55 CST

## 2021-03-04 NOTE — ANESTHESIA PROCEDURE NOTES
Airway  Urgency: elective    Date/Time: 3/4/2021 7:03 AM  Airway not difficult    General Information and Staff    Patient location during procedure: OR  CRNA: Macarena Morejon CRNA    Indications and Patient Condition  Indications for airway management: airway protection    Preoxygenated: yes  Mask difficulty assessment: 0 - not attempted    Final Airway Details  Final airway type: supraglottic airway      Successful airway: LMA, I-gel and Supreme  Size 4    Number of attempts at approach: 1  Assessment: lips, teeth, and gum same as pre-op and atraumatic intubation

## 2021-03-04 NOTE — BRIEF OP NOTE
Radiographic performance and interpretation note    Indication: Left proximal ureteral stone    Procedure:  1. Left proximal ureteral stone    Interpretation:  imaging revealed that the stone is likely been retropulsed back into the lower pole.  Retrograde pyelogram confirms filling defect in the lower pole consistent with stone location.  Normal course and caliber of the ureter otherwise

## 2021-03-08 ENCOUNTER — TELEPHONE (OUTPATIENT)
Dept: UROLOGY | Facility: CLINIC | Age: 71
End: 2021-03-08

## 2021-03-08 ENCOUNTER — TELEPHONE (OUTPATIENT)
Dept: GASTROENTEROLOGY | Facility: CLINIC | Age: 71
End: 2021-03-08

## 2021-03-08 NOTE — TELEPHONE ENCOUNTER
Pt C/O rectal bleeding. Ronda scheduled her to see Letty tomorrow. I explained to pt if she has bleeding between now and her appt tomorrow to go to the ER. She recently had surgery for a kidney stone and was constipated tues-sat. She took stool softeners and passed blood yesterday morning. This morning she has some blood in her stool. She states she is no longer constipated.

## 2021-03-08 NOTE — TELEPHONE ENCOUNTER
PT called in and LVM asking how long she should take her Flomax. I called pt to follow up with her, as she also had an appt on the nurse schedule, to remove her string stent. She said she successfully removed the string stent this morning at home. I advised her to continue taking her Flomax until her follow up appointment with Dr. Michaels on 04/13. Pt verbalized understanding.

## 2021-03-09 ENCOUNTER — OFFICE VISIT (OUTPATIENT)
Dept: GASTROENTEROLOGY | Facility: CLINIC | Age: 71
End: 2021-03-09

## 2021-03-09 VITALS
SYSTOLIC BLOOD PRESSURE: 144 MMHG | HEART RATE: 85 BPM | WEIGHT: 188 LBS | TEMPERATURE: 97.3 F | DIASTOLIC BLOOD PRESSURE: 84 MMHG | BODY MASS INDEX: 32.1 KG/M2 | OXYGEN SATURATION: 97 % | HEIGHT: 64 IN

## 2021-03-09 DIAGNOSIS — K62.5 RECTAL BLEEDING: Primary | ICD-10-CM

## 2021-03-09 DIAGNOSIS — Z86.010 HX OF COLONIC POLYP: ICD-10-CM

## 2021-03-09 DIAGNOSIS — Z83.71 FAMILY HX COLONIC POLYPS: ICD-10-CM

## 2021-03-09 PROCEDURE — 99214 OFFICE O/P EST MOD 30 MIN: CPT | Performed by: NURSE PRACTITIONER

## 2021-03-09 RX ORDER — DOCUSATE SODIUM 100 MG/1
100 CAPSULE, LIQUID FILLED ORAL
COMMUNITY
End: 2021-12-06

## 2021-03-09 RX ORDER — FAMOTIDINE 20 MG/1
20 TABLET, FILM COATED ORAL AS NEEDED
COMMUNITY

## 2021-03-09 NOTE — PROGRESS NOTES
[facility] GASTROENTEROLOGY - OFFICE NOTE    3/9/2021    Deanna Tsai   1950    Primary Physician: Liseth Patel MD    Chief Complaint   Patient presents with   • GI Bleeding         HISTORY OF PRESENT ILLNESS:     Deanna Tsai is a 71 y.o. female presents with rectal bleeding.  This was noted 2 days ago.  She had been constipated so she took a laxative and the next morning she noted bright red blood with her stool.  Stool was formed.  She did have another episode yesterday morning and this morning with only very small amount of bright red blood noted.  She did have some abdominal cramping after taking a laxative.  No rectal pain.  No unintentional weight loss.  No lightheadedness or dizziness.  No syncopal episodes.    Last colonoscopy December 2018 noting 4 polyps (not all polyps retrieved, path did reveal hyperplastic polyp) and sigmoid diverticulosis.     Past Medical History:   Diagnosis Date   • Colon polyp    • GERD (gastroesophageal reflux disease)    • Kidney stone    • Personal history of covid-19 11/2020    No s/s and/or any distress; pt believes was a false positive       Past Surgical History:   Procedure Laterality Date   • BREAST SURGERY     • COLONOSCOPY  08/31/2005    hyperplastic polyp, enlarged ileocecal valve   • COLONOSCOPY N/A 12/6/2018    Procedure: COLONOSCOPY WITH ANESTHESIA;  Surgeon: Kwame Layne MD;  Location: [facility] ENDOSCOPY;  Service: Gastroenterology   • CYSTOSCOPY, RETROGRADE PYELOGRAM AND STENT INSERTION Left 2/16/2021    Procedure: CYSTOSCOPY RETROGRADE AND STENT INSERTION;  Surgeon: Kali Michaels MD;  Location: [facility] OR;  Service: Urology;  Laterality: Left;   • ENDOSCOPY N/A 12/6/2018    Procedure: ESOPHAGOGASTRODUODENOSCOPY WITH ANESTHESIA;  Surgeon: Kwame Layne MD;  Location: [facility] ENDOSCOPY;  Service: Gastroenterology   • HYSTERECTOMY      MILA BSO for fibroids   • TONSILLECTOMY     • URETEROSCOPY LASER LITHOTRIPSY WITH STENT INSERTION Left  "3/4/2021    Procedure: URETEROSCOPY LASER LITHOTRIPSY WITH STENT INSERTION, LEFT RETROGRADE PYELOGRAMS;  Surgeon: Kali Michaels MD;  Location: [facility];  Service: Urology;  Laterality: Left;   • WISDOM TOOTH EXTRACTION         Outpatient Medications Marked as Taking for the 3/9/21 encounter (Office Visit) with Letty Brian APRN   Medication Sig Dispense Refill   • docusate sodium (COLACE) 100 MG capsule Take 100 mg by mouth.     • famotidine (PEPCID) 20 MG tablet Take 20 mg by mouth As Needed for Heartburn.     • Multiple Vitamins-Minerals (PRESERVISION AREDS PO) Take  by mouth 2 (Two) Times a Day.     • tamsulosin (FLOMAX) 0.4 MG capsule 24 hr capsule TAKE 1 CAPSULE BY MOUTH DAILY 90 capsule 0       No Known Allergies    Social History     Socioeconomic History   • Marital status:      Spouse name: Not on file   • Number of children: Not on file   • Years of education: Not on file   • Highest education level: Not on file   Tobacco Use   • Smoking status: Never Smoker   • Smokeless tobacco: Never Used   Substance and Sexual Activity   • Alcohol use: Yes     Comment: Occasional   • Drug use: No   • Sexual activity: Defer       Family History   Problem Relation Age of Onset   • Colon polyps Father    • Breast cancer Mother    • Ovarian cancer Neg Hx    • Colon cancer Neg Hx    • Uterine cancer Neg Hx        Review of Systems   Constitutional: Negative for chills, fever and unexpected weight change.   Respiratory: Negative for cough, shortness of breath and wheezing.    Cardiovascular: Negative for chest pain and palpitations.   Gastrointestinal: Positive for blood in stool. Negative for abdominal distention, diarrhea, nausea and vomiting.        Vitals:    03/09/21 1256   BP: 144/84   Pulse: 85   Temp: 97.3 °F (36.3 °C)   SpO2: 97%   Weight: 85.3 kg (188 lb)   Height: 162.6 cm (64\")      Body mass index is 32.27 kg/m².    Physical Exam  Vitals reviewed.   Constitutional:       General: She is not in " acute distress.  Cardiovascular:      Rate and Rhythm: Normal rate and regular rhythm.      Heart sounds: Normal heart sounds.   Pulmonary:      Effort: Pulmonary effort is normal.      Breath sounds: Normal breath sounds.   Abdominal:      General: Bowel sounds are normal. There is no distension.      Palpations: Abdomen is soft.      Tenderness: There is no abdominal tenderness.   Skin:     General: Skin is warm and dry.   Neurological:      Mental Status: She is alert.         Results for orders placed or performed in visit on 02/24/21   COVID-19,APTIMA PANTHER,PAD IN-HOUSE,NP/OP/NASAL SWAB IN UTM/VTM/SALINE/LIQUID AMIES TRANSPORT MEDIA/NP WASH OR ASPIRATE, 24 HR TAT - Swab, Oropharynx    Specimen: Oropharynx; Swab   Result Value Ref Range    COVID19 Not Detected Not Detected - Ref. Range           ASSESSMENT AND PLAN    Assessment/Plan     Diagnoses and all orders for this visit:    1. Rectal bleeding (Primary)  -     Case Request; Standing  -     Case Request    2. Hx of colonic polyp    3. Family hx colonic polyps    Other orders  -     Follow Anesthesia Guidelines / Protocol; Future  -     Implement Anesthesia Orders Day of Procedure; Standing  -     Obtain Informed Consent; Standing  -     Obtain Informed Consent; Future           In regards to rectal bleeding, differential diagnosis discussed.  I would recommend colonoscopy for further evaluation and she is agreeable.  We will use MiraLAX prep.  I recommend ER if worsening bleeding.    COLONOSCOPY WITH ANESTHESIA (N/A)   All risks, benefits, alternatives, and indications of colonoscopy procedure have been discussed with the patient. Risks to include perforation of the colon requiring possible surgery or colostomy, risk of bleeding from biopsies or removal of colon tissue, possibility of missing a colon polyp or cancer, or adverse drug reaction.  Benefits to include the diagnosis and management of disease of the colon and rectum. Alternatives to include  barium enema, radiographic evaluation, lab testing or no intervention. Pt verbalizes understanding and agrees.              Body mass index is 32.27 kg/m².    Patient's Body mass index is 32.27 kg/m². BMI is above normal parameters. Recommendations include: recommend weight loss, no f/u .               CRISTHIAN Matos    EMR Dragon/transcription disclaimer:  Much of this encounter note is electronic transcription/translation of spoken language to printed text.  The electronic translation of spoken language may be erroneous, or at times, nonsensical words or phrases may be inadvertently transcribed.  Although I have reviewed the note for such errors, some may still exist.

## 2021-04-02 ENCOUNTER — TRANSCRIBE ORDERS (OUTPATIENT)
Dept: LAB | Facility: HOSPITAL | Age: 71
End: 2021-04-02

## 2021-04-02 DIAGNOSIS — Z01.818 PREOPERATIVE TESTING: Primary | ICD-10-CM

## 2021-04-06 ENCOUNTER — LAB (OUTPATIENT)
Dept: LAB | Facility: HOSPITAL | Age: 71
End: 2021-04-06

## 2021-04-06 LAB — SARS-COV-2 ORF1AB RESP QL NAA+PROBE: NOT DETECTED

## 2021-04-06 PROCEDURE — U0004 COV-19 TEST NON-CDC HGH THRU: HCPCS | Performed by: INTERNAL MEDICINE

## 2021-04-06 PROCEDURE — C9803 HOPD COVID-19 SPEC COLLECT: HCPCS | Performed by: INTERNAL MEDICINE

## 2021-04-06 PROCEDURE — U0005 INFEC AGEN DETEC AMPLI PROBE: HCPCS | Performed by: INTERNAL MEDICINE

## 2021-04-07 NOTE — PROGRESS NOTES
Subjective    Ms. Tsai is 71 y.o. female    Chief Complaint: ureter stone    History of Present Illness     Doing well since surgery. Had KUB and ODESSA today. Normal. I viewed images.    The following portions of the patient's history were reviewed and updated as appropriate: allergies, current medications, past family history, past medical history, past social history, past surgical history and problem list.    Review of Systems   Constitutional: Negative for chills and fever.   Gastrointestinal: Negative for abdominal pain, anal bleeding and blood in stool.   Genitourinary: Negative for dysuria, frequency, hematuria and urgency.         Current Outpatient Medications:   •  docusate sodium (COLACE) 100 MG capsule, Take 100 mg by mouth., Disp: , Rfl:   •  famotidine (PEPCID) 20 MG tablet, Take 20 mg by mouth As Needed for Heartburn., Disp: , Rfl:   •  Multiple Vitamins-Minerals (PRESERVISION AREDS PO), Take  by mouth 2 (Two) Times a Day., Disp: , Rfl:   •  oxyCODONE-acetaminophen (Percocet) 7.5-325 MG per tablet, Take 1 tablet by mouth Every 6 (Six) Hours As Needed for Moderate Pain ., Disp: 8 tablet, Rfl: 0  •  tamsulosin (FLOMAX) 0.4 MG capsule 24 hr capsule, TAKE 1 CAPSULE BY MOUTH DAILY, Disp: 90 capsule, Rfl: 0    Past Medical History:   Diagnosis Date   • Colon polyp    • GERD (gastroesophageal reflux disease)    • Kidney stone    • Personal history of covid-19 11/2020    No s/s and/or any distress; pt believes was a false positive       Past Surgical History:   Procedure Laterality Date   • BREAST SURGERY     • COLONOSCOPY  08/31/2005    hyperplastic polyp, enlarged ileocecal valve   • COLONOSCOPY N/A 12/6/2018    Procedure: COLONOSCOPY WITH ANESTHESIA;  Surgeon: Kwame Layne MD;  Location: [facility] ENDOSCOPY;  Service: Gastroenterology   • COLONOSCOPY N/A 4/9/2021    Procedure: COLONOSCOPY WITH ANESTHESIA;  Surgeon: Kwame Layne MD;  Location: [facility] ENDOSCOPY;  Service: Gastroenterology;   "Laterality: N/A;  preop; rectal bleed   postop; polyps  PCP Liseth Patel    • CYSTOSCOPY, RETROGRADE PYELOGRAM AND STENT INSERTION Left 2/16/2021    Procedure: CYSTOSCOPY RETROGRADE AND STENT INSERTION;  Surgeon: Kali Michaels MD;  Location: [facility] OR;  Service: Urology;  Laterality: Left;   • ENDOSCOPY N/A 12/6/2018    Procedure: ESOPHAGOGASTRODUODENOSCOPY WITH ANESTHESIA;  Surgeon: Kwame Layne MD;  Location: [facility] ENDOSCOPY;  Service: Gastroenterology   • HYSTERECTOMY      MILA BSO for fibroids   • TONSILLECTOMY     • URETEROSCOPY LASER LITHOTRIPSY WITH STENT INSERTION Left 3/4/2021    Procedure: URETEROSCOPY LASER LITHOTRIPSY WITH STENT INSERTION, LEFT RETROGRADE PYELOGRAMS;  Surgeon: Kali Michaels MD;  Location: [facility] OR;  Service: Urology;  Laterality: Left;   • WISDOM TOOTH EXTRACTION         Social History     Socioeconomic History   • Marital status:      Spouse name: Not on file   • Number of children: Not on file   • Years of education: Not on file   • Highest education level: Not on file   Tobacco Use   • Smoking status: Never Smoker   • Smokeless tobacco: Never Used   Vaping Use   • Vaping Use: Never used   Substance and Sexual Activity   • Alcohol use: Yes     Comment: Occasional   • Drug use: No   • Sexual activity: Defer       Family History   Problem Relation Age of Onset   • Colon polyps Father    • Breast cancer Mother    • Ovarian cancer Neg Hx    • Colon cancer Neg Hx    • Uterine cancer Neg Hx        Objective    Temp 97.9 °F (36.6 °C) (Temporal)   Ht 162.6 cm (64\")   Wt 85.8 kg (189 lb 3.2 oz)   LMP  (LMP Unknown)   BMI 32.48 kg/m²     Physical Exam        Results for orders placed or performed in visit on 04/13/21   POC Urinalysis Dipstick, Multipro    Specimen: Urine   Result Value Ref Range    Color Yellow Yellow, Straw, Dark Yellow, N[place]    Clarity, UA Clear Clear    Glucose, UA Negative Negative, 1000 mg/dL (3+) mg/dL    Bilirubin Negative Negative    " Ketones, UA Negative Negative    Specific Gravity  1.010 1.005 - 1.030    Blood, UA Negative Negative    pH, Urine 5.5 5.0 - 8.0    Protein, POC Negative Negative mg/dL    Urobilinogen, UA Normal Normal    Nitrite, UA Negative Negative    Leukocytes Negative Negative     Assessment and Plan    Diagnoses and all orders for this visit:    1. Ureteral stone (Primary)  -     POC Urinalysis Dipstick, Multipro      FU PRN. Patient will drink 2 L water daily. Call office if concerns or new urologic problems.

## 2021-04-09 ENCOUNTER — ANESTHESIA (OUTPATIENT)
Dept: GASTROENTEROLOGY | Facility: HOSPITAL | Age: 71
End: 2021-04-09

## 2021-04-09 ENCOUNTER — HOSPITAL ENCOUNTER (OUTPATIENT)
Facility: HOSPITAL | Age: 71
Setting detail: HOSPITAL OUTPATIENT SURGERY
Discharge: HOME | End: 2021-04-09
Attending: INTERNAL MEDICINE | Admitting: INTERNAL MEDICINE

## 2021-04-09 ENCOUNTER — ANESTHESIA EVENT (OUTPATIENT)
Dept: GASTROENTEROLOGY | Facility: HOSPITAL | Age: 71
End: 2021-04-09

## 2021-04-09 VITALS
HEART RATE: 64 BPM | SYSTOLIC BLOOD PRESSURE: 106 MMHG | BODY MASS INDEX: 31.76 KG/M2 | DIASTOLIC BLOOD PRESSURE: 68 MMHG | WEIGHT: 186 LBS | HEIGHT: 64 IN | OXYGEN SATURATION: 100 % | TEMPERATURE: 97.2 F | RESPIRATION RATE: 18 BRPM

## 2021-04-09 DIAGNOSIS — K62.5 RECTAL BLEEDING: ICD-10-CM

## 2021-04-09 PROCEDURE — 88305 TISSUE EXAM BY PATHOLOGIST: CPT | Performed by: INTERNAL MEDICINE

## 2021-04-09 PROCEDURE — 25010000002 PROPOFOL 10 MG/ML EMULSION: Performed by: NURSE ANESTHETIST, CERTIFIED REGISTERED

## 2021-04-09 PROCEDURE — 45385 COLONOSCOPY W/LESION REMOVAL: CPT | Performed by: INTERNAL MEDICINE

## 2021-04-09 RX ORDER — SODIUM CHLORIDE 9 MG/ML
500 INJECTION, SOLUTION INTRAVENOUS CONTINUOUS PRN
Status: DISCONTINUED | OUTPATIENT
Start: 2021-04-09 | End: 2021-04-09 | Stop reason: HOSPADM

## 2021-04-09 RX ORDER — SODIUM CHLORIDE 9 MG/ML
100 INJECTION, SOLUTION INTRAVENOUS CONTINUOUS
Status: CANCELLED | OUTPATIENT
Start: 2021-04-09

## 2021-04-09 RX ORDER — SODIUM CHLORIDE 0.9 % (FLUSH) 0.9 %
10 SYRINGE (ML) INJECTION AS NEEDED
Status: DISCONTINUED | OUTPATIENT
Start: 2021-04-09 | End: 2021-04-09 | Stop reason: HOSPADM

## 2021-04-09 RX ORDER — SODIUM CHLORIDE 0.9 % (FLUSH) 0.9 %
10 SYRINGE (ML) INJECTION EVERY 12 HOURS SCHEDULED
Status: CANCELLED | OUTPATIENT
Start: 2021-04-09

## 2021-04-09 RX ORDER — LIDOCAINE HYDROCHLORIDE 20 MG/ML
INJECTION, SOLUTION EPIDURAL; INFILTRATION; INTRACAUDAL; PERINEURAL AS NEEDED
Status: DISCONTINUED | OUTPATIENT
Start: 2021-04-09 | End: 2021-04-09 | Stop reason: SURG

## 2021-04-09 RX ORDER — PROPOFOL 10 MG/ML
VIAL (ML) INTRAVENOUS AS NEEDED
Status: DISCONTINUED | OUTPATIENT
Start: 2021-04-09 | End: 2021-04-09 | Stop reason: SURG

## 2021-04-09 RX ORDER — MIDAZOLAM HYDROCHLORIDE 1 MG/ML
0.5 INJECTION INTRAMUSCULAR; INTRAVENOUS
Status: CANCELLED | OUTPATIENT
Start: 2021-04-09

## 2021-04-09 RX ORDER — SODIUM CHLORIDE 0.9 % (FLUSH) 0.9 %
10 SYRINGE (ML) INJECTION AS NEEDED
Status: CANCELLED | OUTPATIENT
Start: 2021-04-09

## 2021-04-09 RX ORDER — MIDAZOLAM HYDROCHLORIDE 1 MG/ML
1 INJECTION INTRAMUSCULAR; INTRAVENOUS
Status: CANCELLED | OUTPATIENT
Start: 2021-04-09

## 2021-04-09 RX ORDER — ONDANSETRON 2 MG/ML
4 INJECTION INTRAMUSCULAR; INTRAVENOUS ONCE AS NEEDED
Status: DISCONTINUED | OUTPATIENT
Start: 2021-04-09 | End: 2021-04-09 | Stop reason: HOSPADM

## 2021-04-09 RX ADMIN — SODIUM CHLORIDE: 0.9 INJECTION, SOLUTION INTRAVENOUS at 07:47

## 2021-04-09 RX ADMIN — LIDOCAINE HYDROCHLORIDE 100 MG: 20 INJECTION, SOLUTION EPIDURAL; INFILTRATION; INTRACAUDAL; PERINEURAL at 07:50

## 2021-04-09 RX ADMIN — PROPOFOL 450 MG: 10 INJECTION, EMULSION INTRAVENOUS at 07:50

## 2021-04-09 NOTE — ANESTHESIA POSTPROCEDURE EVALUATION
"Patient: Deanna Tsai    Procedure Summary     Date: 04/09/21 Room / Location: [facility] ENDOSCOPY 6 / [facility] ENDOSCOPY    Anesthesia Start: 0747 Anesthesia Stop: 0824    Procedure: COLONOSCOPY WITH ANESTHESIA (N/A ) Diagnosis:       Rectal bleeding      (Rectal bleeding [K62.5])    Surgeons: Kwame Layne MD Provider: Susan Paris CRNA    Anesthesia Type: MAC ASA Status: 2          Anesthesia Type: MAC    Vitals  Vitals Value Taken Time   /68 04/09/21 0840   Temp     Pulse 63 04/09/21 0841   Resp 13 04/09/21 0825   SpO2 99 % 04/09/21 0841   Vitals shown include unvalidated device data.        Post Anesthesia Care and Evaluation    Patient location during evaluation: PHASE II  Patient participation: complete - patient participated  Level of consciousness: awake and awake and alert  Pain score: 0  Pain management: adequate  Airway patency: patent  Anesthetic complications: No anesthetic complications  PONV Status: none  Cardiovascular status: acceptable  Respiratory status: acceptable  Hydration status: acceptable    Comments: Patient discharged according to acceptable Maynor score per RN assessment. See nursing records for further information.     Blood pressure 95/59, pulse 59, temperature 97.2 °F (36.2 °C), temperature source Temporal, resp. rate 13, height 162.6 cm (64\"), weight 84.4 kg (186 lb), SpO2 94 %.        "

## 2021-04-09 NOTE — ANESTHESIA PREPROCEDURE EVALUATION
Anesthesia Evaluation     Patient summary reviewed and Nursing notes reviewed   NPO Solid Status: > 8 hours  NPO Liquid Status: > 4 hours           Airway   Mallampati: I  TM distance: >3 FB  Neck ROM: full  No difficulty expected  Dental - normal exam     Pulmonary - negative pulmonary ROS and normal exam   Cardiovascular - negative cardio ROS and normal exam        Neuro/Psych- negative ROS  GI/Hepatic/Renal/Endo    (+)  GERD well controlled, GI bleeding upper resolved, renal disease stones,     Musculoskeletal (-) negative ROS    Abdominal  - normal exam   Substance History - negative use     OB/GYN negative ob/gyn ROS         Other - negative ROS                       Anesthesia Plan    ASA 2     MAC   total IV anesthesia  intravenous induction     Anesthetic plan, all risks, benefits, and alternatives have been provided, discussed and informed consent has been obtained with: patient and spouse/significant other.

## 2021-04-09 NOTE — H&P
[facility] Gastroenterology  Pre Procedure History & Physical    Chief Complaint:   Rectal bleeding    Subjective     HPI:   She is experiencing constipation with some rectal bleeding.  She presents now for colonoscopy exam. She tells me she has been doing better since the one episode of the bleeding.    Past Medical History:   Past Medical History:   Diagnosis Date   • Colon polyp    • GERD (gastroesophageal reflux disease)    • Kidney stone    • Personal history of covid-19 11/2020    No s/s and/or any distress; pt believes was a false positive       Past Surgical History:  Past Surgical History:   Procedure Laterality Date   • BREAST SURGERY     • COLONOSCOPY  08/31/2005    hyperplastic polyp, enlarged ileocecal valve   • COLONOSCOPY N/A 12/6/2018    Procedure: COLONOSCOPY WITH ANESTHESIA;  Surgeon: Kwame Layne MD;  Location: [facility] ENDOSCOPY;  Service: Gastroenterology   • CYSTOSCOPY, RETROGRADE PYELOGRAM AND STENT INSERTION Left 2/16/2021    Procedure: CYSTOSCOPY RETROGRADE AND STENT INSERTION;  Surgeon: Kali Michaels MD;  Location: [facility] OR;  Service: Urology;  Laterality: Left;   • ENDOSCOPY N/A 12/6/2018    Procedure: ESOPHAGOGASTRODUODENOSCOPY WITH ANESTHESIA;  Surgeon: Kwame Layne MD;  Location: [facility] ENDOSCOPY;  Service: Gastroenterology   • HYSTERECTOMY      MILA BSO for fibroids   • TONSILLECTOMY     • URETEROSCOPY LASER LITHOTRIPSY WITH STENT INSERTION Left 3/4/2021    Procedure: URETEROSCOPY LASER LITHOTRIPSY WITH STENT INSERTION, LEFT RETROGRADE PYELOGRAMS;  Surgeon: Kali Michaels MD;  Location: [facility] OR;  Service: Urology;  Laterality: Left;   • WISDOM TOOTH EXTRACTION          Family History:  Family History   Problem Relation Age of Onset   • Colon polyps Father    • Breast cancer Mother    • Ovarian cancer Neg Hx    • Colon cancer Neg Hx    • Uterine cancer Neg Hx        Social History:   reports that she has never smoked. She has never used smokeless tobacco. She  "reports current alcohol use. She reports that she does not use drugs.    Medications:   Prior to Admission medications    Medication Sig Start Date End Date Taking? Authorizing Provider   docusate sodium (COLACE) 100 MG capsule Take 100 mg by mouth.   Yes David Pinto MD   tamsulosin (FLOMAX) 0.4 MG capsule 24 hr capsule TAKE 1 CAPSULE BY MOUTH DAILY 3/4/21  Yes Erwin Bishop PA   famotidine (PEPCID) 20 MG tablet Take 20 mg by mouth As Needed for Heartburn.    David Pinto MD   Multiple Vitamins-Minerals (PRESERVISION AREDS PO) Take  by mouth 2 (Two) Times a Day.    ProviderDavid MD   oxyCODONE-acetaminophen (Percocet) 7.5-325 MG per tablet Take 1 tablet by mouth Every 6 (Six) Hours As Needed for Moderate Pain . 3/4/21   Kali Michaels MD   phenazopyridine (PYRIDIUM) 200 MG tablet 1 po tid x 3 days  Patient taking differently: As Needed. 1 po tid x 3 days 6/14/18 4/9/21  Rachelle Lloyd APRN       Allergies:  Patient has no known allergies.    ROS:    General: Weight stable  Resp: No SOA  Cardiovascular: No CP    Objective     Blood pressure 143/71, pulse 63, temperature 97.2 °F (36.2 °C), temperature source Temporal, resp. rate 17, height 162.6 cm (64\"), weight 84.4 kg (186 lb), SpO2 98 %.    Physical Exam   Constitutional: Pt is oriented to person, place, and in no distress.   Cardiovascular: Normal rate, regular rhythm.    Pulmonary/Chest: Effort normal. No respiratory distress.    Abdominal: Non-distended.  Psychiatric: Mood, memory, affect and judgment appear normal.     Assessment/Plan     Diagnosis:      Blood per rectum    Anticipated Surgical Procedure:  Colonoscopy    The risks, benefits, and alternatives of this procedure have been discussed with the patient or the responsible party- the patient understands and agrees to proceed.    EMR Dragon/transcription disclaimer:  Much of this encounter note is electronic transcription/translation of spoken language to " printed text.  The electronic translation of spoken language may be erroneous, or at times, nonsensical words or phrases may be inadvertently transcribed.  Although I have reviewed the note for such errors, some may still exist.

## 2021-04-13 ENCOUNTER — HOSPITAL ENCOUNTER (OUTPATIENT)
Dept: GENERAL RADIOLOGY | Facility: HOSPITAL | Age: 71
Discharge: HOME | End: 2021-04-13

## 2021-04-13 ENCOUNTER — OFFICE VISIT (OUTPATIENT)
Dept: UROLOGY | Facility: CLINIC | Age: 71
End: 2021-04-13

## 2021-04-13 ENCOUNTER — HOSPITAL ENCOUNTER (OUTPATIENT)
Dept: ULTRASOUND IMAGING | Facility: HOSPITAL | Age: 71
Discharge: HOME | End: 2021-04-13

## 2021-04-13 VITALS — BODY MASS INDEX: 32.3 KG/M2 | HEIGHT: 64 IN | WEIGHT: 189.2 LBS | TEMPERATURE: 97.9 F

## 2021-04-13 DIAGNOSIS — N20.1 URETERAL STONE: ICD-10-CM

## 2021-04-13 DIAGNOSIS — N20.1 URETERAL STONE: Primary | ICD-10-CM

## 2021-04-13 LAB
BILIRUB BLD-MCNC: NEGATIVE MG/DL
CLARITY, POC: CLEAR
COLOR UR: YELLOW
GLUCOSE UR STRIP-MCNC: NEGATIVE MG/DL
KETONES UR QL: NEGATIVE
LEUKOCYTE EST, POC: NEGATIVE
NITRITE UR-MCNC: NEGATIVE MG/ML
PH UR: 5.5 [PH] (ref 5–8)
PROT UR STRIP-MCNC: NEGATIVE MG/DL
RBC # UR STRIP: NEGATIVE /UL
SP GR UR: 1.01 (ref 1–1.03)
UROBILINOGEN UR QL: NORMAL

## 2021-04-13 PROCEDURE — 74018 RADEX ABDOMEN 1 VIEW: CPT

## 2021-04-13 PROCEDURE — 76775 US EXAM ABDO BACK WALL LIM: CPT

## 2021-04-13 PROCEDURE — 99213 OFFICE O/P EST LOW 20 MIN: CPT | Performed by: UROLOGY

## 2021-04-13 PROCEDURE — 81003 URINALYSIS AUTO W/O SCOPE: CPT | Performed by: UROLOGY

## 2021-04-14 ENCOUNTER — HOSPITAL ENCOUNTER (OUTPATIENT)
Dept: ULTRASOUND IMAGING | Age: 71
Discharge: HOME | End: 2021-04-14
Payer: MEDICARE

## 2021-04-14 DIAGNOSIS — K76.9 LIVER LESION: ICD-10-CM

## 2021-04-14 PROCEDURE — 76705 ECHO EXAM OF ABDOMEN: CPT

## 2021-06-22 ENCOUNTER — TELEPHONE (OUTPATIENT)
Dept: OBSTETRICS AND GYNECOLOGY | Facility: CLINIC | Age: 71
End: 2021-06-22

## 2021-06-22 NOTE — TELEPHONE ENCOUNTER
PC states her  has hurt his back & can't keep appt this am with David Vaughn aware. Pt to call back to r/s.

## 2021-07-13 DIAGNOSIS — N20.1 URETERAL STONE: Primary | ICD-10-CM

## 2021-07-13 NOTE — PROGRESS NOTES
Subjective    Ms. Tsai is 71 y.o. female    Chief Complaint: Flank pain    History of Present Illness  Patient with history of ureteral stone developed urgency frequency and pain a few days ago and she also noted some blood when wiping after voiding.  She states the symptoms have improved she increased her water intake.  She is not aware of passing a stone.  Patient got a KUB prior to her appointment today but did not show an obvious calcification to indicate a kidney stone.  Urinalysis was negative today.    The following portions of the patient's history were reviewed and updated as appropriate: allergies, current medications, past family history, past medical history, past social history, past surgical history and problem list.    Review of Systems   Constitutional: Negative for chills and fever.   Gastrointestinal: Negative for abdominal pain, anal bleeding and blood in stool.   Genitourinary: Negative for dysuria and hematuria.         Current Outpatient Medications:   •  docusate sodium (COLACE) 100 MG capsule, Take 100 mg by mouth., Disp: , Rfl:   •  famotidine (PEPCID) 20 MG tablet, Take 20 mg by mouth As Needed for Heartburn., Disp: , Rfl:   •  Multiple Vitamins-Minerals (PRESERVISION AREDS PO), Take  by mouth 2 (Two) Times a Day., Disp: , Rfl:   •  oxyCODONE-acetaminophen (Percocet) 7.5-325 MG per tablet, Take 1 tablet by mouth Every 6 (Six) Hours As Needed for Moderate Pain ., Disp: 8 tablet, Rfl: 0  •  tamsulosin (FLOMAX) 0.4 MG capsule 24 hr capsule, TAKE 1 CAPSULE BY MOUTH DAILY, Disp: 90 capsule, Rfl: 0    Past Medical History:   Diagnosis Date   • Colon polyp    • GERD (gastroesophageal reflux disease)    • Kidney stone    • Personal history of covid-19 11/2020    No s/s and/or any distress; pt believes was a false positive       Past Surgical History:   Procedure Laterality Date   • BREAST SURGERY     • COLONOSCOPY  08/31/2005    hyperplastic polyp, enlarged ileocecal valve   • COLONOSCOPY N/A  "12/6/2018    Procedure: COLONOSCOPY WITH ANESTHESIA;  Surgeon: Kwame Layne MD;  Location: [facility] ENDOSCOPY;  Service: Gastroenterology   • COLONOSCOPY N/A 4/9/2021    Procedure: COLONOSCOPY WITH ANESTHESIA;  Surgeon: Kwame Layne MD;  Location: [facility] ENDOSCOPY;  Service: Gastroenterology;  Laterality: N/A;  preop; rectal bleed   postop; polyps  PCP Liseth Patel    • CYSTOSCOPY, RETROGRADE PYELOGRAM AND STENT INSERTION Left 2/16/2021    Procedure: CYSTOSCOPY RETROGRADE AND STENT INSERTION;  Surgeon: Kali Michaels MD;  Location: [facility] OR;  Service: Urology;  Laterality: Left;   • ENDOSCOPY N/A 12/6/2018    Procedure: ESOPHAGOGASTRODUODENOSCOPY WITH ANESTHESIA;  Surgeon: Kwame Layne MD;  Location: [facility] ENDOSCOPY;  Service: Gastroenterology   • HYSTERECTOMY      MILA BSO for fibroids   • TONSILLECTOMY     • URETEROSCOPY LASER LITHOTRIPSY WITH STENT INSERTION Left 3/4/2021    Procedure: URETEROSCOPY LASER LITHOTRIPSY WITH STENT INSERTION, LEFT RETROGRADE PYELOGRAMS;  Surgeon: Kali Michaels MD;  Location: [facility] OR;  Service: Urology;  Laterality: Left;   • WISDOM TOOTH EXTRACTION         Social History     Socioeconomic History   • Marital status:      Spouse name: Not on file   • Number of children: Not on file   • Years of education: Not on file   • Highest education level: Not on file   Tobacco Use   • Smoking status: Never Smoker   • Smokeless tobacco: Never Used   Vaping Use   • Vaping Use: Never used   Substance and Sexual Activity   • Alcohol use: Yes     Comment: Occasional   • Drug use: No   • Sexual activity: Defer       Family History   Problem Relation Age of Onset   • Colon polyps Father    • Breast cancer Mother    • Ovarian cancer Neg Hx    • Colon cancer Neg Hx    • Uterine cancer Neg Hx        Objective    Temp 97.4 °F (36.3 °C)   Ht 162.6 cm (64\")   Wt 86.1 kg (189 lb 12.8 oz)   LMP  (LMP Unknown)   BMI 32.58 kg/m²     Physical Exam  Vitals reviewed. "   Constitutional:       Appearance: Normal appearance.   HENT:      Head: Normocephalic and atraumatic.      Right Ear: External ear normal.      Left Ear: External ear normal.   Eyes:      Conjunctiva/sclera: Conjunctivae normal.   Neck:      Comments: I observed no obvious neck masses.  Pulmonary:      Effort: Pulmonary effort is normal.   Abdominal:      Palpations: Abdomen is soft.      Tenderness: There is no abdominal tenderness. There is no right CVA tenderness or left CVA tenderness.   Neurological:      General: No focal deficit present.      Mental Status: She is alert and oriented to person, place, and time.   Psychiatric:         Mood and Affect: Mood normal.         Behavior: Behavior normal.             Results for orders placed or performed in visit on 07/14/21   POC Urinalysis Dipstick, Multipro    Specimen: Urine   Result Value Ref Range    Color Yellow Yellow, Straw, Dark Yellow, N[place]omi    Clarity, UA Clear Clear    Glucose, UA Negative Negative, 1000 mg/dL (3+) mg/dL    Bilirubin Negative Negative    Ketones, UA Negative Negative    Specific Gravity  1.005 1.005 - 1.030    Blood, UA Negative Negative    pH, Urine 6.0 5.0 - 8.0    Protein, POC Negative Negative mg/dL    Urobilinogen, UA Normal Normal    Nitrite, UA Negative Negative    Leukocytes Trace (A) Negative       KUB independent review    A KUB is available for me to review today.  The image is inspected for a bowel gas pattern and the general bone structure of the spine and pelvis. The kidneys are then inspected closely.  Renal outline is noted if identifiable. The kidney, collecting system, and anticipated path of the ureter are examined for calcifications including those in the true pelvis.  This film reveals:    On the right there are no calcificaitons seen in the kidney or the expected course of the ureter. .    On the left there are no calcificaitons seen in the kidney or the expected course of the ureter.    Assessment and  Plan    Diagnoses and all orders for this visit:    1. Flank pain (Primary)  -     POC Urinalysis Dipstick, Multipro  -     CT Abdomen Pelvis Without Contrast; Future    2. History of kidney stones  -     CT Abdomen Pelvis Without Contrast; Future    3. Hematuria, unspecified type    Patient with history of urinary ureteral stone requiring ureteroscopy which was done by Dr. Michaels 03/04/2021.  At that time she had no other stones.  Patient developed flank pain urgency frequency and noted blood on her tissue when wiping after voiding.  Symptoms have improved.  I explained to her that I could not see any obvious calcification on the KUB.  After discussion given her hematuria and the fact she has not seen a stone pass and her recent symptoms would like her to get a CT without contrast to evaluate for any ureteral stone not seen on KUB.  After she gets the CT scan I will contact her with results and follow-up.

## 2021-07-14 ENCOUNTER — HOSPITAL ENCOUNTER (OUTPATIENT)
Dept: GENERAL RADIOLOGY | Facility: HOSPITAL | Age: 71
Discharge: HOME | End: 2021-07-14

## 2021-07-14 ENCOUNTER — OFFICE VISIT (OUTPATIENT)
Dept: UROLOGY | Facility: CLINIC | Age: 71
End: 2021-07-14

## 2021-07-14 ENCOUNTER — HOSPITAL ENCOUNTER (OUTPATIENT)
Dept: CT IMAGING | Facility: HOSPITAL | Age: 71
Discharge: HOME | End: 2021-07-14

## 2021-07-14 VITALS — WEIGHT: 189.8 LBS | BODY MASS INDEX: 32.4 KG/M2 | TEMPERATURE: 97.4 F | HEIGHT: 64 IN

## 2021-07-14 DIAGNOSIS — R10.9 FLANK PAIN: ICD-10-CM

## 2021-07-14 DIAGNOSIS — Z87.442 HISTORY OF KIDNEY STONES: ICD-10-CM

## 2021-07-14 DIAGNOSIS — R10.9 FLANK PAIN: Primary | ICD-10-CM

## 2021-07-14 DIAGNOSIS — R31.9 HEMATURIA, UNSPECIFIED TYPE: ICD-10-CM

## 2021-07-14 LAB
BILIRUB BLD-MCNC: NEGATIVE MG/DL
CLARITY, POC: CLEAR
COLOR UR: YELLOW
GLUCOSE UR STRIP-MCNC: NEGATIVE MG/DL
KETONES UR QL: NEGATIVE
LEUKOCYTE EST, POC: ABNORMAL
NITRITE UR-MCNC: NEGATIVE MG/ML
PH UR: 6 [PH] (ref 5–8)
PROT UR STRIP-MCNC: NEGATIVE MG/DL
RBC # UR STRIP: NEGATIVE /UL
SP GR UR: 1 (ref 1–1.03)
UROBILINOGEN UR QL: NORMAL

## 2021-07-14 PROCEDURE — 81001 URINALYSIS AUTO W/SCOPE: CPT | Performed by: PHYSICIAN ASSISTANT

## 2021-07-14 PROCEDURE — 74018 RADEX ABDOMEN 1 VIEW: CPT

## 2021-07-14 PROCEDURE — 74176 CT ABD & PELVIS W/O CONTRAST: CPT

## 2021-07-14 PROCEDURE — 99214 OFFICE O/P EST MOD 30 MIN: CPT | Performed by: PHYSICIAN ASSISTANT

## 2021-07-15 ENCOUNTER — TELEPHONE (OUTPATIENT)
Dept: UROLOGY | Facility: CLINIC | Age: 71
End: 2021-07-15

## 2021-07-15 NOTE — TELEPHONE ENCOUNTER
I reviewed her CT scan and discussed the results with the patient.  I do not see any obvious kidney stones or ureteral stones.  Nothing in the bladder.  Tiny right exophytic lesion too small to characterize most likely cyst.  Explained that the symptoms she was having now they have resolved could either been a small stone passing the did not cause any resulting obstruction.  Patient states she will call as needed if she needs to get in if she becomes symptomatic.  She had no further questions

## 2021-08-05 DIAGNOSIS — Z00.00 MEDICARE ANNUAL WELLNESS VISIT, SUBSEQUENT: Primary | ICD-10-CM

## 2021-08-05 DIAGNOSIS — R73.01 IFG (IMPAIRED FASTING GLUCOSE): ICD-10-CM

## 2021-08-06 ENCOUNTER — VIRTUAL VISIT (OUTPATIENT)
Dept: INTERNAL MEDICINE | Age: 71
End: 2021-08-06
Payer: MEDICARE

## 2021-08-06 DIAGNOSIS — J21.9 ACUTE BRONCHITIS AND BRONCHIOLITIS: Primary | ICD-10-CM

## 2021-08-06 DIAGNOSIS — J01.00 ACUTE NON-RECURRENT MAXILLARY SINUSITIS: ICD-10-CM

## 2021-08-06 DIAGNOSIS — J34.89 SINUS PRESSURE: ICD-10-CM

## 2021-08-06 DIAGNOSIS — R05.9 COUGH: ICD-10-CM

## 2021-08-06 DIAGNOSIS — J20.9 ACUTE BRONCHITIS AND BRONCHIOLITIS: Primary | ICD-10-CM

## 2021-08-06 PROCEDURE — 1036F TOBACCO NON-USER: CPT | Performed by: INTERNAL MEDICINE

## 2021-08-06 PROCEDURE — G8417 CALC BMI ABV UP PARAM F/U: HCPCS | Performed by: INTERNAL MEDICINE

## 2021-08-06 PROCEDURE — 1090F PRES/ABSN URINE INCON ASSESS: CPT | Performed by: INTERNAL MEDICINE

## 2021-08-06 PROCEDURE — G8400 PT W/DXA NO RESULTS DOC: HCPCS | Performed by: INTERNAL MEDICINE

## 2021-08-06 PROCEDURE — 1123F ACP DISCUSS/DSCN MKR DOCD: CPT | Performed by: INTERNAL MEDICINE

## 2021-08-06 PROCEDURE — 99213 OFFICE O/P EST LOW 20 MIN: CPT | Performed by: INTERNAL MEDICINE

## 2021-08-06 PROCEDURE — G8427 DOCREV CUR MEDS BY ELIG CLIN: HCPCS | Performed by: INTERNAL MEDICINE

## 2021-08-06 PROCEDURE — 3017F COLORECTAL CA SCREEN DOC REV: CPT | Performed by: INTERNAL MEDICINE

## 2021-08-06 PROCEDURE — 4040F PNEUMOC VAC/ADMIN/RCVD: CPT | Performed by: INTERNAL MEDICINE

## 2021-08-06 RX ORDER — ALBUTEROL SULFATE 90 UG/1
2 AEROSOL, METERED RESPIRATORY (INHALATION) 4 TIMES DAILY PRN
Qty: 3 INHALER | Refills: 0 | Status: SHIPPED | OUTPATIENT
Start: 2021-08-06 | End: 2021-08-11 | Stop reason: CLARIF

## 2021-08-06 RX ORDER — CEFDINIR 300 MG/1
300 CAPSULE ORAL 2 TIMES DAILY
Qty: 14 CAPSULE | Refills: 0 | Status: SHIPPED | OUTPATIENT
Start: 2021-08-06 | End: 2021-08-11

## 2021-08-06 SDOH — ECONOMIC STABILITY: FOOD INSECURITY: WITHIN THE PAST 12 MONTHS, THE FOOD YOU BOUGHT JUST DIDN'T LAST AND YOU DIDN'T HAVE MONEY TO GET MORE.: NEVER TRUE

## 2021-08-06 SDOH — ECONOMIC STABILITY: FOOD INSECURITY: WITHIN THE PAST 12 MONTHS, YOU WORRIED THAT YOUR FOOD WOULD RUN OUT BEFORE YOU GOT MONEY TO BUY MORE.: NEVER TRUE

## 2021-08-06 ASSESSMENT — ENCOUNTER SYMPTOMS
SORE THROAT: 0
SINUS PRESSURE: 1
SINUS PAIN: 1
CONSTIPATION: 0
WHEEZING: 0
COUGH: 1
RHINORRHEA: 1
CHEST TIGHTNESS: 0
ABDOMINAL PAIN: 0

## 2021-08-06 ASSESSMENT — SOCIAL DETERMINANTS OF HEALTH (SDOH): HOW HARD IS IT FOR YOU TO PAY FOR THE VERY BASICS LIKE FOOD, HOUSING, MEDICAL CARE, AND HEATING?: NOT HARD AT ALL

## 2021-08-06 NOTE — PROGRESS NOTES
Smoker    Smokeless tobacco: Never Used   Substance Use Topics    Alcohol use: No      Family History   Problem Relation Age of Onset    Breast Cancer Mother 52    COPD Father     Emphysema Father     Pancreatic Cancer Maternal Uncle         Age unknown       Review of Systems   Constitutional: Positive for fatigue. Negative for chills and fever. HENT: Positive for congestion, rhinorrhea, sinus pressure and sinus pain. Negative for ear pain, nosebleeds, postnasal drip and sore throat. Respiratory: Positive for cough. Negative for chest tightness and wheezing. Cardiovascular: Negative for chest pain, palpitations and leg swelling. Gastrointestinal: Negative for abdominal pain and constipation. Genitourinary: Negative for dysuria and urgency. Musculoskeletal: Negative. Negative for arthralgias. Skin: Negative for rash. Neurological: Negative for dizziness and headaches. Psychiatric/Behavioral: Negative. There were no vitals filed for this visit. There is no height or weight on file to calculate BMI.   Patient-Reported Vitals 8/6/2021   Patient-Reported Weight 188   Patient-Reported Height 5 4   Patient-Reported Systolic -   Patient-Reported Diastolic -   Patient-Reported Pulse -   Patient-Reported Temperature -   Patient-Reported SpO2 -        Physical Exam  PHYSICAL EXAMINATION:  [ INSTRUCTIONS:  \"[x]\" Indicates a positive item  \"[]\" Indicates a negative item  -- DELETE ALL ITEMS NOT EXAMINED]  [x] Alert  [x] Oriented to person/place/time    [x] No apparent distress  [] Toxic appearing    [] Face flushed appearing [] Sclera clear  [] Lips are cyanotic      [x] Breathing appears normal  [] Appears tachypneic      [] Rash on visible skin    [x] Cranial Nerves II-XII grossly intact    [x] Motor grossly intact in visible upper extremities    [x] Motor grossly intact in visible lower extremities    [x] Normal Mood  [] Anxious appearing    [] Depressed appearing  [] Confused appearing 02/13/2021 6.5     Protein, UA 02/13/2021 TRACE*    Urobilinogen, Urine 02/13/2021 1.0     Nitrite, Urine 02/13/2021 Negative     Leukocyte Esterase, Urine 02/13/2021 TRACE*    Lactic Acid 02/13/2021 1.0     P-R Interval 02/13/2021 160     QRS Duration 02/13/2021 86     Q-T Interval 02/13/2021 414     QTc Calculation (Bazett) 02/13/2021 411     P Axis 02/13/2021 35     T Axis 02/13/2021 21     Bacteria, UA 02/13/2021 NEGATIVE*    Crystals, UA 02/13/2021 NEG*    Hyaline Casts, UA 02/13/2021 0     WBC, UA 02/13/2021 2     RBC, UA 02/13/2021 414*    Epithelial Cells, UA 02/13/2021 1            ASSESSMENT/PLAN:    Acute bronchitis and bronchiolitis    Sinus pressure      Acute non-recurrent maxillary sinusitis      Cough    Patient is vaccinated against COVID-19  With above symptoms and current delta virus spike will obtain  -     COVID-19; Future    rx  -     cefdinir (OMNICEF) 300 MG capsule; Take 1 capsule by mouth 2 times daily for 7 days  -     albuterol sulfate HFA (VENTOLIN HFA) 108 (90 Base) MCG/ACT inhaler; Inhale 2 puffs into the lungs 4 times daily as needed for Wheezing    Additionally, recommend patient to use Mucinex 600 twice daily OTC  Recommend patient use Flonase no spray 2 sprays each side once daily in the evening    If sx not improving and resolving , if sx continue or re-occur pt has been instructed to call us and / or return here for follow- up evaluation            Orders Placed This Encounter   Procedures    COVID-19     New Prescriptions    ALBUTEROL SULFATE HFA (VENTOLIN HFA) 108 (90 BASE) MCG/ACT INHALER    Inhale 2 puffs into the lungs 4 times daily as needed for Wheezing    CEFDINIR (OMNICEF) 300 MG CAPSULE    Take 1 capsule by mouth 2 times daily for 7 days         No follow-ups on file. There are no Patient Instructions on file for this visit.   EMR Dragon/transcription disclaimer:Significant part of this  encounter note is electronic transcription/translationof spoken language to printed text. The electronic translation of spoken language may be erroneous, or at times, nonsensical words or phrases may be inadvertently transcribed.  Although I have reviewed the note for sucherrors, some may still exist.

## 2021-08-11 ENCOUNTER — OFFICE VISIT (OUTPATIENT)
Dept: INTERNAL MEDICINE | Age: 71
End: 2021-08-11
Payer: MEDICARE

## 2021-08-11 VITALS
WEIGHT: 189 LBS | OXYGEN SATURATION: 98 % | DIASTOLIC BLOOD PRESSURE: 88 MMHG | RESPIRATION RATE: 18 BRPM | SYSTOLIC BLOOD PRESSURE: 132 MMHG | HEIGHT: 64 IN | BODY MASS INDEX: 32.27 KG/M2 | HEART RATE: 76 BPM

## 2021-08-11 DIAGNOSIS — Z00.00 MEDICARE ANNUAL WELLNESS VISIT, INITIAL: Primary | ICD-10-CM

## 2021-08-11 DIAGNOSIS — E55.9 VITAMIN D DEFICIENCY: ICD-10-CM

## 2021-08-11 DIAGNOSIS — Z23 NEED FOR PNEUMOCOCCAL VACCINATION: ICD-10-CM

## 2021-08-11 DIAGNOSIS — Z00.00 ROUTINE GENERAL MEDICAL EXAMINATION AT A HEALTH CARE FACILITY: ICD-10-CM

## 2021-08-11 DIAGNOSIS — R73.01 IFG (IMPAIRED FASTING GLUCOSE): ICD-10-CM

## 2021-08-11 DIAGNOSIS — E78.00 PURE HYPERCHOLESTEROLEMIA: ICD-10-CM

## 2021-08-11 PROCEDURE — 90732 PPSV23 VACC 2 YRS+ SUBQ/IM: CPT | Performed by: INTERNAL MEDICINE

## 2021-08-11 PROCEDURE — G8427 DOCREV CUR MEDS BY ELIG CLIN: HCPCS | Performed by: INTERNAL MEDICINE

## 2021-08-11 PROCEDURE — G0438 PPPS, INITIAL VISIT: HCPCS | Performed by: INTERNAL MEDICINE

## 2021-08-11 PROCEDURE — G8400 PT W/DXA NO RESULTS DOC: HCPCS | Performed by: INTERNAL MEDICINE

## 2021-08-11 PROCEDURE — 1090F PRES/ABSN URINE INCON ASSESS: CPT | Performed by: INTERNAL MEDICINE

## 2021-08-11 PROCEDURE — 99213 OFFICE O/P EST LOW 20 MIN: CPT | Performed by: INTERNAL MEDICINE

## 2021-08-11 PROCEDURE — G0009 ADMIN PNEUMOCOCCAL VACCINE: HCPCS | Performed by: INTERNAL MEDICINE

## 2021-08-11 PROCEDURE — 4040F PNEUMOC VAC/ADMIN/RCVD: CPT | Performed by: INTERNAL MEDICINE

## 2021-08-11 PROCEDURE — 1123F ACP DISCUSS/DSCN MKR DOCD: CPT | Performed by: INTERNAL MEDICINE

## 2021-08-11 PROCEDURE — 3017F COLORECTAL CA SCREEN DOC REV: CPT | Performed by: INTERNAL MEDICINE

## 2021-08-11 PROCEDURE — 1036F TOBACCO NON-USER: CPT | Performed by: INTERNAL MEDICINE

## 2021-08-11 PROCEDURE — G8417 CALC BMI ABV UP PARAM F/U: HCPCS | Performed by: INTERNAL MEDICINE

## 2021-08-11 ASSESSMENT — ENCOUNTER SYMPTOMS
CONSTIPATION: 0
EYE REDNESS: 0
COUGH: 0
EYE PAIN: 0
COLOR CHANGE: 0
ABDOMINAL PAIN: 0
BLOOD IN STOOL: 0
SINUS PRESSURE: 0
VOMITING: 0
NAUSEA: 0
TROUBLE SWALLOWING: 0
WHEEZING: 0
VOICE CHANGE: 0
SORE THROAT: 0
CHEST TIGHTNESS: 0
DIARRHEA: 0

## 2021-08-11 ASSESSMENT — PATIENT HEALTH QUESTIONNAIRE - PHQ9
SUM OF ALL RESPONSES TO PHQ QUESTIONS 1-9: 0
SUM OF ALL RESPONSES TO PHQ9 QUESTIONS 1 & 2: 0
2. FEELING DOWN, DEPRESSED OR HOPELESS: 0
1. LITTLE INTEREST OR PLEASURE IN DOING THINGS: 0
SUM OF ALL RESPONSES TO PHQ QUESTIONS 1-9: 0
SUM OF ALL RESPONSES TO PHQ QUESTIONS 1-9: 0

## 2021-08-11 ASSESSMENT — LIFESTYLE VARIABLES: HOW OFTEN DO YOU HAVE A DRINK CONTAINING ALCOHOL: 0

## 2021-08-11 NOTE — PROGRESS NOTES
Chief Complaint   Patient presents with    Multiple medical problems     History of presenting illness: Oswaldo Farfan is a80 y.o. female who presents today for follow up on her chronic medical conditions as noted below. Patient Active Problem List    Diagnosis Date Noted    IFG (impaired fasting glucose) 01/31/2019    Pure hypercholesterolemia 01/31/2019    Gastroesophageal reflux disease without esophagitis 01/28/2019    Hiatal hernia 01/28/2019    Exogenous obesity 01/28/2019     No past medical history on file. Past Surgical History:   Procedure Laterality Date    HYSTERECTOMY      GRAHAM AND BSO  2001    dr Alex Barrios     Current Outpatient Medications   Medication Sig Dispense Refill    Multiple Vitamins-Minerals (ICAPS AREDS 2 PO) Take by mouth      cefdinir (OMNICEF) 300 MG capsule Take 1 capsule by mouth 2 times daily for 7 days 14 capsule 0    famotidine (PEPCID) 10 MG tablet Take 10 mg by mouth 2 times daily as needed        No current facility-administered medications for this visit. No Known Allergies  Social History     Tobacco Use    Smoking status: Never Smoker    Smokeless tobacco: Never Used   Substance Use Topics    Alcohol use: No      Family History   Problem Relation Age of Onset    Breast Cancer Mother 52    COPD Father     Emphysema Father     Pancreatic Cancer Maternal Uncle         Age unknown       Review of Systems   Constitutional: Negative for chills, fatigue and fever. HENT: Negative for congestion, ear pain, postnasal drip, sinus pressure, sore throat, trouble swallowing and voice change. Eyes: Negative for pain, redness and visual disturbance. Respiratory: Negative for cough, chest tightness and wheezing. Cardiovascular: Negative for chest pain, palpitations and leg swelling. Gastrointestinal: Negative for abdominal pain, blood in stool, constipation, diarrhea, nausea and vomiting.    Endocrine: Negative for polydipsia and polyuria. Genitourinary: Negative for dysuria, enuresis, flank pain, frequency and urgency. Musculoskeletal: Negative for arthralgias, gait problem and joint swelling. Skin: Negative for color change and rash. Neurological: Negative for dizziness, tremors, syncope, facial asymmetry, speech difficulty, weakness, numbness and headaches. Psychiatric/Behavioral: Negative for agitation, behavioral problems, confusion, sleep disturbance and suicidal ideas. The patient is not nervous/anxious. Vitals:    08/11/21 1304   BP: 132/88   Site: Left Upper Arm   Position: Sitting   Cuff Size: Large Adult   Pulse: 76   Resp: 18   SpO2: 98%   Weight: 189 lb (85.7 kg)   Height: 5' 4\" (1.626 m)     Body mass index is 32.44 kg/m². Physical Exam  Constitutional:       General: She is not in acute distress. Appearance: She is well-developed. She is not diaphoretic. HENT:      Head: Normocephalic and atraumatic. Nose: Nose normal.   Eyes:      General: No scleral icterus. Right eye: No discharge. Left eye: No discharge. Conjunctiva/sclera: Conjunctivae normal.      Pupils: Pupils are equal, round, and reactive to light. Neck:      Thyroid: No thyromegaly. Vascular: No JVD. Cardiovascular:      Rate and Rhythm: Normal rate and regular rhythm. Heart sounds: No murmur heard. Pulmonary:      Breath sounds: Normal breath sounds. No wheezing or rales. Chest:      Chest wall: No tenderness. Abdominal:      General: Bowel sounds are normal. There is no distension. Tenderness: There is no guarding. Musculoskeletal:      Cervical back: Normal range of motion. Lymphadenopathy:      Cervical: No cervical adenopathy. Skin:     General: Skin is dry. Findings: No erythema or rash. Neurological:      Mental Status: She is oriented to person, place, and time. Cranial Nerves: No cranial nerve deficit.       Coordination: Coordination normal.      Deep Tendon Reflexes: Reflexes are normal and symmetric. Psychiatric:         Behavior: Behavior normal.         Thought Content:  Thought content normal.         Judgment: Judgment normal.         Lab Review   Orders Only on 08/10/2021   Component Date Value    Sodium 08/10/2021 144     Potassium 08/10/2021 4.7     Chloride 08/10/2021 105     CO2 08/10/2021 26     Anion Gap 08/10/2021 13     Glucose 08/10/2021 93     BUN 08/10/2021 19     CREATININE 08/10/2021 0.9     GFR Non- 08/10/2021 >60     GFR  08/10/2021 >59     Calcium 08/10/2021 9.6     Total Protein 08/10/2021 7.0     Albumin 08/10/2021 4.3     Total Bilirubin 08/10/2021 0.6     Alkaline Phosphatase 08/10/2021 83     ALT 08/10/2021 16     AST 08/10/2021 15     WBC 08/10/2021 4.9     RBC 08/10/2021 4.14*    Hemoglobin 08/10/2021 13.0     Hematocrit 08/10/2021 40.3     MCV 08/10/2021 97.3     MCH 08/10/2021 31.4*    MCHC 08/10/2021 32.3*    RDW 08/10/2021 12.7     Platelets 18/85/7733 249     MPV 08/10/2021 10.2     Neutrophils % 08/10/2021 59.3     Lymphocytes % 08/10/2021 29.5     Monocytes % 08/10/2021 6.9     Eosinophils % 08/10/2021 3.3     Basophils % 08/10/2021 0.6     Neutrophils Absolute 08/10/2021 2.9     Immature Granulocytes # 08/10/2021 0.0     Lymphocytes Absolute 08/10/2021 1.5     Monocytes Absolute 08/10/2021 0.30     Eosinophils Absolute 08/10/2021 0.20     Basophils Absolute 08/10/2021 0.00     Hemoglobin A1C 08/10/2021 5.7     Cholesterol, Total 08/10/2021 223*    Triglycerides 08/10/2021 107     HDL 08/10/2021 81     LDL Calculated 08/10/2021 121     Color, UA 08/10/2021 YELLOW     Clarity, UA 08/10/2021 Clear     Glucose, Ur 08/10/2021 Negative     Bilirubin Urine 08/10/2021 Negative     Ketones, Urine 08/10/2021 Negative     Specific Gravity, UA 08/10/2021 1.020     Blood, Urine 08/10/2021 Negative     pH, UA 08/10/2021 6.5     Protein, UA 08/10/2021 Negative  Urobilinogen, Urine 08/10/2021 0.2     Nitrite, Urine 08/10/2021 Negative     Leukocyte Esterase, Urine 08/10/2021 Negative     TSH 08/10/2021 3.440    Orders Only on 08/06/2021   Component Date Value    SARS-CoV-2, PCR 08/06/2021 Not Detected            ASSESSMENT/PLAN:    IFG (impaired fasting glucose)  Hemoglobin A1c is elevated at 5.7  Previously has been normal  Increased risk for diabetes discussed with patient  Daily physical activity discussed with patient  Healthy, mostly fiber rich nonstarchy plant-based diet recommended  Recommend to decrease intake of processed foods, simple carbohydrates and animal-based products that high in saturated fats    Pure hypercholesterolemia  LDL is higher, elevated    I have personally reviewed and interpreted these lab results and thoroughly discussed with patient  Discussed with patient regarding  Decrease saturated fats in diet  This includes milk fats like cheese, cream, butter  This also includes red meat and all processed meats  Increase Plant-based meals , fish    Suggest we repeat this in 6 months    Vitamin D deficiency  We will obtain vitamin D level at next appointment  Take multivitamin with vitamin D daily      Orders Placed This Encounter   Procedures    PNEUMOVAX 23 subcutaneous/IM (Pneumococcal polysaccharide vaccine 23-valent >= 3yo)    Hemoglobin A1C    Lipid Panel    Vitamin D 25 Hydroxy     New Prescriptions    No medications on file         Return for Medicare Annual Wellness Visit in 1 year. EMR Dragon/transcription disclaimer:Significant part of this  encounter note is electronic transcription/translationof spoken language to printed text. The electronic translation of spoken language may be erroneous, or at times, nonsensical words or phrases may be inadvertently transcribed.  Although I have reviewed the note for sucherrors, some may still exist.

## 2021-08-11 NOTE — PROGRESS NOTES
Medicare Annual Wellness Visit  Name: Giovanni Austin Date: 2021   MRN: [id number] Sex: Female   Age: 73 y.o. Ethnicity: Non- / Non    : 1950 Race: White (non-)      Cara Riding is here for Medicare AWV    Screenings for behavioral, psychosocial and functional/safety risks, and cognitive dysfunction are all negative except as indicated below. These results, as well as other patient data from the [address] form, are documented in Flowsheets linked to this Encounter. No Known Allergies      Prior to Visit Medications    Medication Sig Taking? Authorizing Provider   Multiple Vitamins-Minerals (ICAPS AREDS 2 PO) Take by mouth Yes Historical Provider, MD   famotidine (PEPCID) 10 MG tablet Take 10 mg by mouth 2 times daily as needed  Yes Historical Provider, MD       No past medical history on file. Past Surgical History:   Procedure Laterality Date    HYSTERECTOMY      GRAHAM AND BSO      dr Sonia Blair         Family History   Problem Relation Age of Onset    Breast Cancer Mother 52    COPD Father     Emphysema Father     Pancreatic Cancer Maternal Uncle         Age unknown       CareTeam (Including outside providers/suppliers regularly involved in providing care):   Patient Care Team:  Kasey Vazquez MD as PCP - General (Internal Medicine)  Kasey Vazquez MD as PCP - REHABILITATION [facility] Empaneled Provider    Wt Readings from Last 3 Encounters:   21 189 lb (85.7 kg)   21 188 lb (85.3 kg)   21 185 lb (83.9 kg)     Vitals:    21 1304   BP: 132/88   Site: Left Upper Arm   Position: Sitting   Cuff Size: Large Adult   Pulse: 76   Resp: 18   SpO2: 98%   Weight: 189 lb (85.7 kg)   Height: 5' 4\" (1.626 m)     Body mass index is 32.44 kg/m². Based upon direct observation of the patient, evaluation of cognition reveals recent and remote memory intact.       Patient's complete Health Risk Assessment and screening values have been reviewed and are found in [facility]. The following problems were reviewed today and where indicated follow up appointments were made and/or referrals ordered. Positive Risk Factor Screenings with Interventions:            General Health and ACP:  General  In general, how would you say your health is?: Good  In the past 7 days, have you experienced any of the following?  New or Increased Pain, New or Increased Fatigue, Loneliness, Social Isolation, Stress or Anger?: None of These  Do you get the social and emotional support that you need?: Yes  Do you have a Living Will?: Yes  Advance Directives     Power of  Living Will ACP-Advance Directive ACP-Power of     Not on File Not on File Not on File Not on File      General Health Risk Interventions:  Health Habits/Nutrition:  Health Habits/Nutrition  Do you exercise for at least 20 minutes 2-3 times per week?: Yes  Have you lost any weight without trying in the past 3 months?: No  Do you eat only one meal per day?: No  Have you seen the dentist within the past year?: Yes  Body mass index: (!) 32.44  Health Habits/Nutrition Interventions:  · Nutritional issues:  educational materials for healthy, well-balanced diet provided       Personalized Preventive Plan   Current Health Maintenance Status  Immunization History   Administered Date(s) Administered    COVID-19, Moderna, PF, 100mcg/0.5mL 01/05/2021, 02/05/2021    Influenza, High Dose (Fluzone 65 yrs and older) 11/06/2020    Influenza, High-dose, Quadv, 65 yrs +, IM (Fluzone) 11/06/2020    Influenza, Quadv, IM, (6 mo and older Fluzone, Flulaval, Fluarix and 3 yrs and older Afluria) 01/18/2019    Pneumococcal Conjugate 13-valent (Nolon Padmini) 01/28/2019    Pneumococcal Polysaccharide ([id number]) 08/11/2021    Zoster Live (Zostavax) 01/28/2016        Health Maintenance   Topic Date Due    Annual Wellness Visit (AWV)  Never done    Breast cancer screen  08/10/2021    DTaP/Tdap/Td vaccine (1 - Tdap) 08/27/2021 (Originally 1/28/1969)    Shingles Vaccine (2 of 3) 02/22/2022 (Originally 3/24/2016)    Flu vaccine (1) 09/01/2021    A1C test (Diabetic or Prediabetic)  08/10/2022    Lipid screen  08/10/2026    Colon cancer screen colonoscopy  04/09/2031    DEXA (modify frequency per FRAX score)  Completed    Pneumococcal 65+ years Vaccine  Completed    COVID-19 Vaccine  Completed    Hepatitis C screen  Completed    Hepatitis A vaccine  Aged Out    Hepatitis B vaccine  Aged Out    Hib vaccine  Aged Out    Meningococcal (ACWY) vaccine  Aged Out     Recommendations for [place] Due: see orders and patient instructions/AVS.  . Recommended screening schedule for the next 5-10 years is provided to the patient in written form: see Patient Instructions/AVS.     Medicare wellness visit/health maintenance  Cscope 2018 repeat 5 yrs  BD normal 2018 repeat 5 yrs  Mammogram - 8/2021  PAP- NA ( GRAHAM)  PPSV 8/2021  PPSV13 - 1/2019  Lipid screen, diabetes screen done at this visit     Patient Instructions     Personalized Preventive Plan for Brigitte Vieyra - 8/11/2021  Medicare offers a range of preventive health benefits. Some of the tests and screenings are paid in full while other may be subject to a deductible, co-insurance, and/or copay. Some of these benefits include a comprehensive review of your medical history including lifestyle, illnesses that may run in your family, and various assessments and screenings as appropriate. After reviewing your medical record and screening and assessments performed today your provider may have ordered immunizations, labs, imaging, and/or referrals for you. A list of these orders (if applicable) as well as your Preventive Care list are included within your After Visit Summary for your review.     Other Preventive Recommendations:    · A preventive eye exam performed by an eye specialist is recommended every 1-2 years to screen for glaucoma; cataracts, macular degeneration, and other eye disorders. · A preventive dental visit is recommended every 6 months. · Try to get at least 150 minutes of exercise per week or 10,000 steps per day on a pedometer . · Order or download the FREE \"Exercise & Physical Activity: Your Everyday Guide\" from [employer]. Call 4-500.382.9863 or search [employer] online. · You need 0694-4229 mg of calcium and 4797-1634 IU of vitamin D per day. It is possible to meet your calcium requirement with diet alone, but a vitamin D supplement is usually necessary to meet this goal.  · When exposed to the sun, use a sunscreen that protects against both UVA and UVB radiation with an SPF of 30 or greater. Reapply every 2 to 3 hours or after sweating, drying off with a towel, or swimming. · Always wear a seat belt when traveling in a car. Always wear a helmet when riding a bicycle or motorcycle.

## 2021-08-11 NOTE — PATIENT INSTRUCTIONS
Personalized Preventive Plan for Syed Rios - 8/11/2021  Medicare offers a range of preventive health benefits. Some of the tests and screenings are paid in full while other may be subject to a deductible, co-insurance, and/or copay. Some of these benefits include a comprehensive review of your medical history including lifestyle, illnesses that may run in your family, and various assessments and screenings as appropriate. After reviewing your medical record and screening and assessments performed today your provider may have ordered immunizations, labs, imaging, and/or referrals for you. A list of these orders (if applicable) as well as your Preventive Care list are included within your After Visit Summary for your review. Other Preventive Recommendations:    · A preventive eye exam performed by an eye specialist is recommended every 1-2 years to screen for glaucoma; cataracts, macular degeneration, and other eye disorders. · A preventive dental visit is recommended every 6 months. · Try to get at least 150 minutes of exercise per week or 10,000 steps per day on a pedometer . · Order or download the FREE \"Exercise & Physical Activity: Your Everyday Guide\" from [employer]. Call 5-538.881.5247 or search [employer] online. · You need 8937-7247 mg of calcium and 6307-2441 IU of vitamin D per day. It is possible to meet your calcium requirement with diet alone, but a vitamin D supplement is usually necessary to meet this goal.  · When exposed to the sun, use a sunscreen that protects against both UVA and UVB radiation with an SPF of 30 or greater. Reapply every 2 to 3 hours or after sweating, drying off with a towel, or swimming. · Always wear a seat belt when traveling in a car. Always wear a helmet when riding a bicycle or motorcycle.

## 2021-08-12 ENCOUNTER — HOSPITAL ENCOUNTER (OUTPATIENT)
Dept: WOMENS IMAGING | Age: 71
Discharge: HOME | End: 2021-08-12
Payer: MEDICARE

## 2021-08-12 DIAGNOSIS — Z12.31 VISIT FOR SCREENING MAMMOGRAM: ICD-10-CM

## 2021-08-12 PROCEDURE — 77067 SCR MAMMO BI INCL CAD: CPT

## 2021-08-16 ENCOUNTER — HOSPITAL ENCOUNTER (OUTPATIENT)
Dept: CT IMAGING | Age: 71
Discharge: HOME | End: 2021-08-16
Payer: MEDICARE

## 2021-08-16 DIAGNOSIS — R91.1 NODULE OF RIGHT LUNG: ICD-10-CM

## 2021-08-16 PROCEDURE — 71260 CT THORAX DX C+: CPT

## 2021-08-16 PROCEDURE — 6360000004 HC RX CONTRAST MEDICATION: Performed by: INTERNAL MEDICINE

## 2021-08-16 RX ADMIN — IOPAMIDOL 60 ML: 755 INJECTION, SOLUTION INTRAVENOUS at 09:34

## 2021-10-25 ENCOUNTER — HOSPITAL ENCOUNTER (OUTPATIENT)
Dept: GENERAL RADIOLOGY | Age: 71
Discharge: HOME | End: 2021-10-25
Payer: MEDICARE

## 2021-10-25 ENCOUNTER — OFFICE VISIT (OUTPATIENT)
Dept: INTERNAL MEDICINE | Age: 71
End: 2021-10-25
Payer: MEDICARE

## 2021-10-25 VITALS
RESPIRATION RATE: 18 BRPM | HEART RATE: 86 BPM | WEIGHT: 188 LBS | HEIGHT: 64 IN | DIASTOLIC BLOOD PRESSURE: 88 MMHG | OXYGEN SATURATION: 98 % | BODY MASS INDEX: 32.1 KG/M2 | SYSTOLIC BLOOD PRESSURE: 128 MMHG

## 2021-10-25 DIAGNOSIS — M25.551 BILATERAL HIP PAIN: ICD-10-CM

## 2021-10-25 DIAGNOSIS — G89.29 CHRONIC BILATERAL LOW BACK PAIN WITHOUT SCIATICA: ICD-10-CM

## 2021-10-25 DIAGNOSIS — M25.552 BILATERAL HIP PAIN: ICD-10-CM

## 2021-10-25 DIAGNOSIS — M51.9 LUMBAR DISC LESION: Primary | ICD-10-CM

## 2021-10-25 DIAGNOSIS — M54.50 CHRONIC BILATERAL LOW BACK PAIN WITHOUT SCIATICA: ICD-10-CM

## 2021-10-25 PROCEDURE — 4040F PNEUMOC VAC/ADMIN/RCVD: CPT | Performed by: INTERNAL MEDICINE

## 2021-10-25 PROCEDURE — 1123F ACP DISCUSS/DSCN MKR DOCD: CPT | Performed by: INTERNAL MEDICINE

## 2021-10-25 PROCEDURE — G8417 CALC BMI ABV UP PARAM F/U: HCPCS | Performed by: INTERNAL MEDICINE

## 2021-10-25 PROCEDURE — G8482 FLU IMMUNIZE ORDER/ADMIN: HCPCS | Performed by: INTERNAL MEDICINE

## 2021-10-25 PROCEDURE — 99213 OFFICE O/P EST LOW 20 MIN: CPT | Performed by: INTERNAL MEDICINE

## 2021-10-25 PROCEDURE — 73521 X-RAY EXAM HIPS BI 2 VIEWS: CPT

## 2021-10-25 PROCEDURE — G8428 CUR MEDS NOT DOCUMENT: HCPCS | Performed by: INTERNAL MEDICINE

## 2021-10-25 PROCEDURE — 1036F TOBACCO NON-USER: CPT | Performed by: INTERNAL MEDICINE

## 2021-10-25 PROCEDURE — 1090F PRES/ABSN URINE INCON ASSESS: CPT | Performed by: INTERNAL MEDICINE

## 2021-10-25 PROCEDURE — 3017F COLORECTAL CA SCREEN DOC REV: CPT | Performed by: INTERNAL MEDICINE

## 2021-10-25 PROCEDURE — G8400 PT W/DXA NO RESULTS DOC: HCPCS | Performed by: INTERNAL MEDICINE

## 2021-10-25 ASSESSMENT — ENCOUNTER SYMPTOMS
WHEEZING: 0
BACK PAIN: 1
ABDOMINAL PAIN: 0
CONSTIPATION: 0
COUGH: 0
CHEST TIGHTNESS: 0
SORE THROAT: 0

## 2021-10-25 NOTE — PROGRESS NOTES
Chief Complaint   Patient presents with    Follow-up     Discuss CT from [facility] on 02/16/2021, Pt states that she has been having some hip discomfort when she goes to work out      History of presenting illness: Lucio Gilford is a80 y.o. female who presents today for follow up on her chronic medical conditions as noted below. Patient Active Problem List    Diagnosis Date Noted    IFG (impaired fasting glucose) 01/31/2019    Pure hypercholesterolemia 01/31/2019    Gastroesophageal reflux disease without esophagitis 01/28/2019    Hiatal hernia 01/28/2019    Exogenous obesity 01/28/2019     No past medical history on file. Past Surgical History:   Procedure Laterality Date    HYSTERECTOMY  2001    age 54    GRAHAM AND BSO Bilateral 2001    age 54   [address]     Current Outpatient Medications   Medication Sig Dispense Refill    Multiple Vitamins-Minerals (ICAPS AREDS 2 PO) Take by mouth      famotidine (PEPCID) 10 MG tablet Take 10 mg by mouth 2 times daily as needed        No current facility-administered medications for this visit. No Known Allergies  Social History     Tobacco Use    Smoking status: Never Smoker    Smokeless tobacco: Never Used   Substance Use Topics    Alcohol use: No      Family History   Problem Relation Age of Onset    Breast Cancer Mother 52    COPD Father     Emphysema Father     Pancreatic Cancer Maternal Uncle         Age unknown       Review of Systems   Constitutional: Positive for fatigue. Negative for chills and fever. HENT: Negative for congestion, ear pain, nosebleeds, postnasal drip and sore throat. Respiratory: Negative for cough, chest tightness and wheezing. Cardiovascular: Negative for chest pain, palpitations and leg swelling. Gastrointestinal: Negative for abdominal pain and constipation. Genitourinary: Negative for dysuria and urgency. Musculoskeletal: Positive for arthralgias and back pain.    Skin: Negative for rash.   Neurological: Negative for dizziness and headaches. Psychiatric/Behavioral: Negative. Vitals:    10/25/21 1310   BP: 128/88   Site: Left Upper Arm   Position: Sitting   Cuff Size: Large Adult   Pulse: 86   Resp: 18   SpO2: 98%   Weight: 188 lb (85.3 kg)   Height: 5' 4\" (1.626 m)     Body mass index is 32.27 kg/m². Physical Exam  Constitutional:       Appearance: She is well-developed. HENT:      Right Ear: External ear normal.      Left Ear: External ear normal.      Mouth/Throat:      Pharynx: No oropharyngeal exudate. Eyes:      Conjunctiva/sclera: Conjunctivae normal.      Pupils: Pupils are equal, round, and reactive to light. Neck:      Thyroid: No thyromegaly. Vascular: No JVD. Cardiovascular:      Rate and Rhythm: Normal rate. Heart sounds: Normal heart sounds. No murmur heard. Pulmonary:      Effort: No respiratory distress. Breath sounds: Normal breath sounds. No wheezing or rales. Chest:      Chest wall: No tenderness. Abdominal:      General: Bowel sounds are normal.      Palpations: Abdomen is soft. Musculoskeletal:      Cervical back: Neck supple. Lymphadenopathy:      Cervical: No cervical adenopathy. Skin:     Findings: No rash. Lab Review   No visits with results within 2 Month(s) from this visit.    Latest known visit with results is:   Orders Only on 08/10/2021   Component Date Value    Sodium 08/10/2021 144     Potassium 08/10/2021 4.7     Chloride 08/10/2021 105     CO2 08/10/2021 26     Anion Gap 08/10/2021 13     Glucose 08/10/2021 93     BUN 08/10/2021 19     CREATININE 08/10/2021 0.9     GFR Non- 08/10/2021 >60     GFR  08/10/2021 >59     Calcium 08/10/2021 9.6     Total Protein 08/10/2021 7.0     Albumin 08/10/2021 4.3     Total Bilirubin 08/10/2021 0.6     Alkaline Phosphatase 08/10/2021 83     ALT 08/10/2021 16     AST 08/10/2021 15     WBC 08/10/2021 4.9     RBC 08/10/2021 4.14*    Hemoglobin 08/10/2021 13.0     Hematocrit 08/10/2021 40.3     MCV 08/10/2021 97.3     MCH 08/10/2021 31.4*    MCHC 08/10/2021 32.3*    RDW 08/10/2021 12.7     Platelets 41/83/1927 249     MPV 08/10/2021 10.2     Neutrophils % 08/10/2021 59.3     Lymphocytes % 08/10/2021 29.5     Monocytes % 08/10/2021 6.9     Eosinophils % 08/10/2021 3.3     Basophils % 08/10/2021 0.6     Neutrophils Absolute 08/10/2021 2.9     Immature Granulocytes # 08/10/2021 0.0     Lymphocytes Absolute 08/10/2021 1.5     Monocytes Absolute 08/10/2021 0.30     Eosinophils Absolute 08/10/2021 0.20     Basophils Absolute 08/10/2021 0.00     Hemoglobin A1C 08/10/2021 5.7     Cholesterol, Total 08/10/2021 223*    Triglycerides 08/10/2021 107     HDL 08/10/2021 81     LDL Calculated 08/10/2021 121     Color, UA 08/10/2021 YELLOW     Clarity, UA 08/10/2021 Clear     Glucose, Ur 08/10/2021 Negative     Bilirubin Urine 08/10/2021 Negative     Ketones, Urine 08/10/2021 Negative     Specific Gravity, UA 08/10/2021 1.020     Blood, Urine 08/10/2021 Negative     pH, UA 08/10/2021 6.5     Protein, UA 08/10/2021 Negative     Urobilinogen, Urine 08/10/2021 0.2     Nitrite, Urine 08/10/2021 Negative     Leukocyte Esterase, Urine 08/10/2021 Negative     TSH 08/10/2021 3.440            ASSESSMENT/PLAN:      Lumbar disc lesion    Bilateral hip pain    Bilateral low back pain    Prior testing results    Impression    1. Mild obstructive uropathy on the left related to an 8 mm stone lodged   at the ureteropelvic junction. 2. Indeterminate 11 mm sclerotic lesion within L3 on the right, a   solitary metastatic lesion cannot be excluded. Clinical correlation is   recommended. Follow-up bone scan may be helpful. 3. 2 small subcentimeter foci of decreased attenuation within the right   lobe of the liver, too small to fully characterize but probably benign. 4. Trace free fluid in the pelvis. No free air is identified. 5. Normal appendix. Follow-up testing  CT chest with contrast 8/2021    Impression   1. Right lower lobe 5 mm nodule, unchanged. 2. A 3 mm left upper lobe nodule. .   Follow-up may be obtained with CT chest without contrast in 12 months. Signed by Dr Bre Edwards         CT abdomen pelvis without contrast    Impression      1.  Negative for urolithiasis or hydronephrosis. 2.  No evidence of active bowel inflammation. Normal appendix. 3.  No change in RIGHT L3 vertebral body sclerotic lesion. This report was finalized on 07/14/2021 16:41 by Dr. Jamir Greene MD.    DW with patient both results  Right L3 vertebral body sclerotic lesion no change in 5 months in 7/2021  We will obtain additional evaluation    Orders Placed This Encounter   Procedures    MRI LUMBAR SPINE W WO CONTRAST    XR HIP BILATERAL W AP PELVIS (2 VIEWS)     New Prescriptions    No medications on file         No follow-ups on file. There are no Patient Instructions on file for this visit. EMR Dragon/transcription disclaimer:Significant part of this  encounter note is electronic transcription/translationof spoken language to printed text. The electronic translation of spoken language may be erroneous, or at times, nonsensical words or phrases may be inadvertently transcribed.  Although I have reviewed the note for sucherrors, some may still exist.

## 2021-11-15 ENCOUNTER — HOSPITAL ENCOUNTER (OUTPATIENT)
Dept: MRI IMAGING | Age: 71
Discharge: HOME | End: 2021-11-15
Payer: MEDICARE

## 2021-11-15 DIAGNOSIS — M51.9 LUMBAR DISC LESION: ICD-10-CM

## 2021-11-15 PROCEDURE — 72158 MRI LUMBAR SPINE W/O & W/DYE: CPT

## 2021-11-15 PROCEDURE — A9585 GADOBUTROL INJECTION: HCPCS | Performed by: INTERNAL MEDICINE

## 2021-11-15 PROCEDURE — 6360000004 HC RX CONTRAST MEDICATION: Performed by: INTERNAL MEDICINE

## 2021-11-15 RX ADMIN — GADOBUTROL 8.5 ML: 604.72 INJECTION INTRAVENOUS at 09:40

## 2021-12-06 ENCOUNTER — OFFICE VISIT (OUTPATIENT)
Dept: OBSTETRICS AND GYNECOLOGY | Facility: CLINIC | Age: 71
End: 2021-12-06

## 2021-12-06 VITALS
WEIGHT: 187 LBS | SYSTOLIC BLOOD PRESSURE: 116 MMHG | HEIGHT: 64 IN | BODY MASS INDEX: 31.92 KG/M2 | DIASTOLIC BLOOD PRESSURE: 70 MMHG

## 2021-12-06 DIAGNOSIS — E66.09 CLASS 1 OBESITY DUE TO EXCESS CALORIES WITHOUT SERIOUS COMORBIDITY WITH BODY MASS INDEX (BMI) OF 32.0 TO 32.9 IN ADULT: ICD-10-CM

## 2021-12-06 DIAGNOSIS — Z01.419 WELL WOMAN EXAM WITH ROUTINE GYNECOLOGICAL EXAM: Primary | ICD-10-CM

## 2021-12-06 DIAGNOSIS — N95.1 MENOPAUSAL STATE: ICD-10-CM

## 2021-12-06 DIAGNOSIS — N95.2 VAGINAL ATROPHY: ICD-10-CM

## 2021-12-06 DIAGNOSIS — Z12.31 ENCOUNTER FOR SCREENING MAMMOGRAM FOR MALIGNANT NEOPLASM OF BREAST: ICD-10-CM

## 2021-12-06 PROCEDURE — G0101 CA SCREEN;PELVIC/BREAST EXAM: HCPCS | Performed by: NURSE PRACTITIONER

## 2021-12-07 RX ORDER — ESTRADIOL 0.1 MG/G
CREAM VAGINAL
Qty: 1 EACH | Refills: 3 | Status: SHIPPED | OUTPATIENT
Start: 2021-12-07

## 2022-01-10 NOTE — PROGRESS NOTES
Deanna Tsai is a 71 y.o.      Chief Complaint   Patient presents with   • Gynecologic Exam     pt here for GYN annual exam.            HPI - Deanan WRIGHT Is in for gyn exam.  She is doing well.  She had a hysterectomy in the past. She has vaginal dryness.    The following portions of the patient's history were reviewed and updated as appropriate:vital signs, allergies, current medications, past family history, past medical history, past social history, past surgical history and problem list.      Current Outpatient Medications:   •  famotidine (PEPCID) 20 MG tablet, Take 20 mg by mouth As Needed for Heartburn., Disp: , Rfl:   •  Multiple Vitamins-Minerals (PRESERVISION AREDS PO), Take  by mouth 2 (Two) Times a Day., Disp: , Rfl:   •  estradiol (ESTRACE) 0.1 MG/GM vaginal cream, 1/2 GM VAGINALLY AT HS, Disp: 1 each, Rfl: 3    Review of Systems   Constitutional: Negative for chills and fever.   HENT: Negative for congestion, ear pain, mouth sores, sneezing, sore throat and tinnitus.    Eyes: Negative for blurred vision, redness, itching and visual disturbance.   Respiratory: Negative for apnea, choking and shortness of breath.    Cardiovascular: Negative for chest pain and palpitations.   Gastrointestinal: Positive for GERD. Negative for abdominal distention, nausea and vomiting.        History of colon polyp   Endocrine: Negative for cold intolerance, polydipsia and polyuria.   Genitourinary: Negative for breast pain, decreased urine volume, flank pain, pelvic pain, vaginal bleeding and vaginal pain.        History of kidney stone     Vaginal dryness  hyst   Musculoskeletal: Negative for gait problem and neck pain.   Skin: Negative for color change and pallor.   Neurological: Negative for dizziness, tremors, seizures, speech difficulty, light-headedness and memory problem.   Psychiatric/Behavioral: Negative for behavioral problems, self-injury and suicidal ideas.         Objective     /70   Ht  "162.6 cm (64\")   Wt 84.8 kg (187 lb)   LMP  (LMP Unknown)   BMI 32.10 kg/m²       Physical Exam  Vitals and nursing note reviewed. Exam conducted with a chaperone present.   Constitutional:       Appearance: She is well-developed.   HENT:      Head: Normocephalic and atraumatic.      Right Ear: External ear normal.      Left Ear: External ear normal.   Eyes:      General: No scleral icterus.        Right eye: No discharge.         Left eye: No discharge.      Conjunctiva/sclera: Conjunctivae normal.   Neck:      Thyroid: No thyromegaly.      Vascular: No carotid bruit.   Cardiovascular:      Rate and Rhythm: Regular rhythm.      Heart sounds: Normal heart sounds. No murmur heard.      Pulmonary:      Effort: Pulmonary effort is normal. No respiratory distress.      Breath sounds: Normal breath sounds.   Chest:   Breasts: Breasts are symmetrical.      Right: No inverted nipple, mass, nipple discharge, skin change or tenderness.      Left: No inverted nipple, mass, nipple discharge, skin change or tenderness.       Abdominal:      General: Bowel sounds are normal. There is no distension.      Palpations: Abdomen is soft. There is no mass.      Tenderness: There is no abdominal tenderness. There is no guarding.      Hernia: No hernia is present. There is no hernia in the left inguinal area or right inguinal area.   Genitourinary:     Exam position: Lithotomy position.      Labia:         Right: No rash, tenderness, lesion or injury.         Left: No rash, tenderness, lesion or injury.       Vagina: Normal. No signs of injury. No vaginal discharge, erythema or bleeding.      Uterus: Absent.       Rectum: Normal. No mass.      Comments: Cervix, Uterus and Adnexa are surgically absent.  Urethra and urethral meatus normal  Bladder, normal with mild  Perineum and anus examined and without lesions  BSU negative  BSU negative  Musculoskeletal:         General: No tenderness. Normal range of motion.      Cervical back: " Normal range of motion and neck supple.   Lymphadenopathy:      Head:      Right side of head: No submental, submandibular, tonsillar, preauricular, posterior auricular or occipital adenopathy.      Left side of head: No submental, submandibular, tonsillar, preauricular, posterior auricular or occipital adenopathy.      Cervical: No cervical adenopathy.      Right cervical: No superficial, deep or posterior cervical adenopathy.     Left cervical: No superficial, deep or posterior cervical adenopathy.      Lower Body: No right inguinal adenopathy. No left inguinal adenopathy.   Skin:     General: Skin is warm and dry.      Findings: No bruising, erythema or rash.   Neurological:      Mental Status: She is alert and oriented to person, place, and time.      Motor: No abnormal muscle tone.      Coordination: Coordination normal.   Psychiatric:         Mood and Affect: Mood normal.         Behavior: Behavior normal.         Thought Content: Thought content normal.         Judgment: Judgment normal.            Assessment/Plan     Diagnoses and all orders for this visit:    1. Well woman exam with routine gynecological exam (Primary)  Comments:  s/p hyst    2. Class 1 obesity due to excess calories without serious comorbidity with body mass index (BMI) of 32.0 to 32.9 in adult  Patient is counseled that her BMI is outside the desired parameters and to decrease calories and exercise as tolerated.    3. Vaginal atrophy  -     estradiol (ESTRACE) 0.1 MG/GM vaginal cream; 1/2 GM VAGINALLY AT HS  Dispense: 1 each; Refill: 3    4. Encounter for screening mammogram for malignant neoplasm of breast  -     Mammo Screening Digital Tomosynthesis Bilateral With CAD; Future    5. Menopausal state  -     DEXA Bone Density Axial; Future    Patient is counseled re: BSE, diet, exercise, mammogram, calcium and Vit. D3              Rachelle Lloyd, APRN  1/10/2022

## 2022-06-21 DIAGNOSIS — E78.00 PURE HYPERCHOLESTEROLEMIA: ICD-10-CM

## 2022-06-21 DIAGNOSIS — E55.9 VITAMIN D DEFICIENCY: ICD-10-CM

## 2022-06-21 DIAGNOSIS — Z00.00 ROUTINE GENERAL MEDICAL EXAMINATION AT A HEALTH CARE FACILITY: ICD-10-CM

## 2022-06-21 DIAGNOSIS — Z00.00 MEDICARE ANNUAL WELLNESS VISIT, INITIAL: ICD-10-CM

## 2022-06-21 DIAGNOSIS — R73.01 IFG (IMPAIRED FASTING GLUCOSE): ICD-10-CM

## 2022-06-21 LAB
CHOLESTEROL, TOTAL: 243 MG/DL (ref 160–199)
HBA1C MFR BLD: 5.9 % (ref 4–6)
HDLC SERPL-MCNC: 88 MG/DL (ref 65–121)
LDL CHOLESTEROL CALCULATED: 133 MG/DL
TRIGL SERPL-MCNC: 108 MG/DL (ref 0–149)
VITAMIN D 25-HYDROXY: 35.2 NG/ML

## 2022-06-22 ENCOUNTER — OFFICE VISIT (OUTPATIENT)
Dept: INTERNAL MEDICINE | Age: 72
End: 2022-06-22
Payer: MEDICARE

## 2022-06-22 VITALS
DIASTOLIC BLOOD PRESSURE: 86 MMHG | WEIGHT: 192 LBS | BODY MASS INDEX: 32.78 KG/M2 | HEIGHT: 64 IN | OXYGEN SATURATION: 95 % | RESPIRATION RATE: 18 BRPM | SYSTOLIC BLOOD PRESSURE: 128 MMHG | HEART RATE: 68 BPM

## 2022-06-22 DIAGNOSIS — E66.09 EXOGENOUS OBESITY: ICD-10-CM

## 2022-06-22 DIAGNOSIS — E55.9 VITAMIN D DEFICIENCY: ICD-10-CM

## 2022-06-22 DIAGNOSIS — E78.00 PURE HYPERCHOLESTEROLEMIA: Primary | ICD-10-CM

## 2022-06-22 DIAGNOSIS — R73.01 IFG (IMPAIRED FASTING GLUCOSE): ICD-10-CM

## 2022-06-22 PROCEDURE — 1036F TOBACCO NON-USER: CPT | Performed by: INTERNAL MEDICINE

## 2022-06-22 PROCEDURE — 99214 OFFICE O/P EST MOD 30 MIN: CPT | Performed by: INTERNAL MEDICINE

## 2022-06-22 PROCEDURE — G8400 PT W/DXA NO RESULTS DOC: HCPCS | Performed by: INTERNAL MEDICINE

## 2022-06-22 PROCEDURE — 1090F PRES/ABSN URINE INCON ASSESS: CPT | Performed by: INTERNAL MEDICINE

## 2022-06-22 PROCEDURE — 3017F COLORECTAL CA SCREEN DOC REV: CPT | Performed by: INTERNAL MEDICINE

## 2022-06-22 PROCEDURE — G8417 CALC BMI ABV UP PARAM F/U: HCPCS | Performed by: INTERNAL MEDICINE

## 2022-06-22 PROCEDURE — 1123F ACP DISCUSS/DSCN MKR DOCD: CPT | Performed by: INTERNAL MEDICINE

## 2022-06-22 PROCEDURE — G8427 DOCREV CUR MEDS BY ELIG CLIN: HCPCS | Performed by: INTERNAL MEDICINE

## 2022-06-22 ASSESSMENT — PATIENT HEALTH QUESTIONNAIRE - PHQ9
SUM OF ALL RESPONSES TO PHQ QUESTIONS 1-9: 0
SUM OF ALL RESPONSES TO PHQ QUESTIONS 1-9: 0
2. FEELING DOWN, DEPRESSED OR HOPELESS: 0
1. LITTLE INTEREST OR PLEASURE IN DOING THINGS: 0
SUM OF ALL RESPONSES TO PHQ QUESTIONS 1-9: 0
SUM OF ALL RESPONSES TO PHQ QUESTIONS 1-9: 0
SUM OF ALL RESPONSES TO PHQ9 QUESTIONS 1 & 2: 0

## 2022-06-22 ASSESSMENT — ENCOUNTER SYMPTOMS
CONSTIPATION: 0
COUGH: 0
SORE THROAT: 0
CHEST TIGHTNESS: 0
ABDOMINAL PAIN: 0
WHEEZING: 0

## 2022-06-22 NOTE — PROGRESS NOTES
Chief Complaint   Patient presents with    6 Month Follow-Up     History of presenting illness: Parviz Urrutia is a67 y.o. female who presents today for follow up on her chronic medical conditions as noted below. Patient Active Problem List    Diagnosis Date Noted    IFG (impaired fasting glucose) 01/31/2019    Pure hypercholesterolemia 01/31/2019    Gastroesophageal reflux disease without esophagitis 01/28/2019    Hiatal hernia 01/28/2019    Exogenous obesity 01/28/2019     No past medical history on file. Past Surgical History:   Procedure Laterality Date    HYSTERECTOMY  2001    age 54   [address]    TOTAL ABDOMINAL HYSTERECTOMY W/ BILATERAL SALPINGOOPHORECTOMY Bilateral 2001    age 54     Current Outpatient Medications   Medication Sig Dispense Refill    Multiple Vitamins-Minerals (ICAPS AREDS 2 PO) Take by mouth      famotidine (PEPCID) 10 MG tablet Take 10 mg by mouth 2 times daily as needed        No current facility-administered medications for this visit. No Known Allergies  Social History     Tobacco Use    Smoking status: Never Smoker    Smokeless tobacco: Never Used   Substance Use Topics    Alcohol use: No      Family History   Problem Relation Age of Onset    Breast Cancer Mother 52    COPD Father     Emphysema Father     Pancreatic Cancer Maternal Uncle         Age unknown       Review of Systems   Constitutional: Positive for fatigue. Negative for chills and fever. HENT: Negative for congestion, ear pain, nosebleeds, postnasal drip and sore throat. Respiratory: Negative for cough, chest tightness and wheezing. Cardiovascular: Negative for chest pain, palpitations and leg swelling. Gastrointestinal: Negative for abdominal pain and constipation. Genitourinary: Negative for dysuria and urgency. Musculoskeletal: Negative. Negative for arthralgias. Skin: Negative for rash. Neurological: Negative for dizziness and headaches. Psychiatric/Behavioral: Negative. Vitals:    06/22/22 0820   BP: 128/86   Site: Left Upper Arm   Position: Sitting   Cuff Size: Large Adult   Pulse: 68   Resp: 18   SpO2: 95%   Weight: 192 lb (87.1 kg)   Height: 5' 4\" (1.626 m)     Body mass index is 32.96 kg/m². Physical Exam  Constitutional:       Appearance: She is well-developed. HENT:      Right Ear: External ear normal.      Left Ear: External ear normal.      Mouth/Throat:      Pharynx: No oropharyngeal exudate. Eyes:      Conjunctiva/sclera: Conjunctivae normal.      Pupils: Pupils are equal, round, and reactive to light. Neck:      Thyroid: No thyromegaly. Vascular: No JVD. Cardiovascular:      Rate and Rhythm: Normal rate. Heart sounds: Normal heart sounds. No murmur heard. Pulmonary:      Effort: No respiratory distress. Breath sounds: Normal breath sounds. No wheezing or rales. Chest:      Chest wall: No tenderness. Abdominal:      General: Bowel sounds are normal.      Palpations: Abdomen is soft. Musculoskeletal:         General: Normal range of motion. Cervical back: Neck supple. Lymphadenopathy:      Cervical: No cervical adenopathy. Skin:     General: Skin is warm. Findings: No rash. Neurological:      Mental Status: She is oriented to person, place, and time.          Lab Review   Orders Only on 06/21/2022   Component Date Value    Vit D, 25-Hydroxy 06/21/2022 35.2     Cholesterol, Total 06/21/2022 243*    Triglycerides 06/21/2022 108     HDL 06/21/2022 88     LDL Calculated 06/21/2022 133     Hemoglobin A1C 06/21/2022 5.9            ASSESSMENT/PLAN:    IFG (impaired fasting glucose)  Hemoglobin A1c is elevated at 5.9 in 6/2022 ( 5.7)  Previously has been normal  Increased risk for diabetes discussed with patient  Daily physical activity discussed with patient  Healthy, mostly fiber rich nonstarchy plant-based diet recommended  Recommend to decrease intake of processed foods, simple carbohydrates and animal-based products that high in saturated fats     Pure hypercholesterolemia  LDL is higher, elevated   ( 121)  I have personally reviewed and interpreted these lab results and thoroughly discussed with patient  Discussed with patient regarding  Decrease saturated fats in diet  This includes milk fats like cheese, cream, butter  This also includes red meat and all processed meats  Increase Plant-based meals , fish    Since patient is worried about her elevated cholesterol and possibility for cardiovascular risk factors would suggest coronary calcium scoring for risk grouping purposes  Patient will think about it and will let me know if she wants to proceed     Suggest we repeat lipid panel in 6 months     Vitamin D deficiency  Level 35 in 6/2022  We will obtain vitamin D level at next appointment  Take multivitamin with vitamin D daily      Orders Placed This Encounter   Procedures    Hemoglobin A1C    Comprehensive Metabolic Panel    CBC with Auto Differential    Lipid Panel    Urinalysis    TSH    Vitamin D 25 Hydroxy     New Prescriptions    No medications on file         Return in about 4 months (around 10/22/2022) for Annual Physical.   There are no Patient Instructions on file for this visit. EMR Dragon/transcription disclaimer:Significant part of this  encounter note is electronic transcription/translationof spoken language to printed text. The electronic translation of spoken language may be erroneous, or at times, nonsensical words or phrases may be inadvertently transcribed.  Although I have reviewed the note for sucherrors, some may still exist.

## 2022-07-13 ENCOUNTER — TELEPHONE (OUTPATIENT)
Dept: INTERNAL MEDICINE | Age: 72
End: 2022-07-13

## 2022-07-13 NOTE — TELEPHONE ENCOUNTER
Spoke to pt advised to do flonase and daily allergy medication and that she can get a covid test OTC to make sure not that and if no better to let us know

## 2022-07-13 NOTE — TELEPHONE ENCOUNTER
----- Message from Amarjitprincess Kaur sent at 7/13/2022  8:44 AM CDT -----  Subject: Appointment Request    Reason for Call: Established Patient Appointment needed: Semi-Routine   Cough, Cold Symptoms    QUESTIONS    Reason for appointment request? No appointments available during search     Additional Information for Provider? Patient did not Leslie Myers make a   appointment does prefer to just speak with nurse in regards of having flu   like symptoms for a couple days. She would like to discuss getting a covid   test taken Please reach out to patient in this regards.   ---------------------------------------------------------------------------  --------------  Karen CHACON  [phone]; OK to leave message on voicemail  ---------------------------------------------------------------------------  --------------  SCRIPT ANSWERS  COVID Screen: Georgette Mann

## 2022-08-08 ENCOUNTER — TELEPHONE (OUTPATIENT)
Dept: INTERNAL MEDICINE | Age: 72
End: 2022-08-08

## 2022-08-09 ENCOUNTER — TELEPHONE (OUTPATIENT)
Dept: SURGERY | Age: 72
End: 2022-08-09

## 2022-08-09 DIAGNOSIS — Z12.31 VISIT FOR SCREENING MAMMOGRAM: Primary | ICD-10-CM

## 2022-08-10 ENCOUNTER — TELEPHONE (OUTPATIENT)
Dept: SURGERY | Age: 72
End: 2022-08-10

## 2022-08-10 NOTE — TELEPHONE ENCOUNTER
The patient didn't call, scheduling called because she was at her appointment. The lady gave me her MRN and asked for the diagnotic order.  My apologies I did not get a number

## 2022-11-08 ENCOUNTER — NURSE ONLY (OUTPATIENT)
Dept: INTERNAL MEDICINE | Age: 72
End: 2022-11-08
Payer: MEDICARE

## 2022-11-08 DIAGNOSIS — Z23 FLU VACCINE NEED: Primary | ICD-10-CM

## 2022-11-08 PROCEDURE — 90694 VACC AIIV4 NO PRSRV 0.5ML IM: CPT | Performed by: INTERNAL MEDICINE

## 2022-11-08 PROCEDURE — G0008 ADMIN INFLUENZA VIRUS VAC: HCPCS | Performed by: INTERNAL MEDICINE

## 2022-11-08 PROCEDURE — 99999 PR OFFICE/OUTPT VISIT,PROCEDURE ONLY: CPT | Performed by: INTERNAL MEDICINE

## 2022-11-08 NOTE — PROGRESS NOTES
Pt received an injection of High Dose Flu Vaccine in the left deltoid in the office today. Pt has signed an Injection consent form for the injection. Pt does not show any signs of reaction to the injection. Pt tolerated the injection well, and is advised to call the office if he/she notices any kind of reaction to happen once the Pt leaves the office.

## 2022-12-12 ENCOUNTER — HOSPITAL ENCOUNTER (OUTPATIENT)
Dept: WOMENS IMAGING | Age: 72
Discharge: HOME | End: 2022-12-12
Payer: MEDICARE

## 2022-12-12 DIAGNOSIS — Z12.31 VISIT FOR SCREENING MAMMOGRAM: ICD-10-CM

## 2022-12-12 PROCEDURE — 77067 SCR MAMMO BI INCL CAD: CPT

## 2023-12-05 ENCOUNTER — TRANSCRIBE ORDERS (OUTPATIENT)
Dept: ADMINISTRATIVE | Facility: HOSPITAL | Age: 73
End: 2023-12-05
Payer: MEDICARE

## 2023-12-05 ENCOUNTER — HOSPITAL ENCOUNTER (OUTPATIENT)
Dept: CT IMAGING | Facility: HOSPITAL | Age: 73
Discharge: HOME | End: 2023-12-05
Admitting: PHYSICIAN ASSISTANT
Payer: MEDICARE

## 2023-12-05 DIAGNOSIS — S52.521A TORUS FRACTURE OF LOWER END OF RIGHT RADIUS, INITIAL ENCOUNTER FOR CLOSED FRACTURE: ICD-10-CM

## 2023-12-05 DIAGNOSIS — S52.521A TORUS FRACTURE OF LOWER END OF RIGHT RADIUS, INITIAL ENCOUNTER FOR CLOSED FRACTURE: Primary | ICD-10-CM

## 2023-12-05 PROCEDURE — 73200 CT UPPER EXTREMITY W/O DYE: CPT

## 2024-08-02 ENCOUNTER — TELEPHONE (OUTPATIENT)
Dept: GASTROENTEROLOGY | Facility: CLINIC | Age: 74
End: 2024-08-02
Payer: MEDICARE

## 2024-08-02 NOTE — TELEPHONE ENCOUNTER
PATIENT IS DUE FOR A REPEAT COLONOSCOPY. LEFT VM TO HAVE PT CALL TO SCHEDULE AN OV TO DISCUSS THIS.        LETTER SENT TO PCP.

## 2024-08-09 ENCOUNTER — HOSPITAL ENCOUNTER (OUTPATIENT)
Dept: WOMENS IMAGING | Age: 74
Discharge: HOME | End: 2024-08-09
Payer: MEDICARE

## 2024-08-09 VITALS — BODY MASS INDEX: 32.27 KG/M2 | WEIGHT: 189 LBS | HEIGHT: 64 IN

## 2024-08-09 DIAGNOSIS — Z12.31 ENCOUNTER FOR SCREENING MAMMOGRAM FOR MALIGNANT NEOPLASM OF BREAST: ICD-10-CM

## 2024-08-09 PROCEDURE — 77063 BREAST TOMOSYNTHESIS BI: CPT

## 2024-08-22 DIAGNOSIS — R73.01 IFG (IMPAIRED FASTING GLUCOSE): ICD-10-CM

## 2024-08-22 DIAGNOSIS — Z00.00 MEDICARE ANNUAL WELLNESS VISIT, SUBSEQUENT: ICD-10-CM

## 2024-08-22 DIAGNOSIS — R53.83 OTHER FATIGUE: ICD-10-CM

## 2024-08-22 DIAGNOSIS — E66.09 EXOGENOUS OBESITY: Primary | ICD-10-CM

## 2024-08-22 DIAGNOSIS — E78.00 PURE HYPERCHOLESTEROLEMIA: ICD-10-CM

## 2024-08-27 DIAGNOSIS — Z00.00 MEDICARE ANNUAL WELLNESS VISIT, SUBSEQUENT: ICD-10-CM

## 2024-08-27 DIAGNOSIS — R53.83 OTHER FATIGUE: ICD-10-CM

## 2024-08-27 DIAGNOSIS — E03.8 SUBCLINICAL HYPOTHYROIDISM: Primary | ICD-10-CM

## 2024-08-27 DIAGNOSIS — E78.00 PURE HYPERCHOLESTEROLEMIA: ICD-10-CM

## 2024-08-27 DIAGNOSIS — E66.09 EXOGENOUS OBESITY: ICD-10-CM

## 2024-08-27 DIAGNOSIS — R73.01 IFG (IMPAIRED FASTING GLUCOSE): ICD-10-CM

## 2024-08-27 LAB
ALBUMIN SERPL-MCNC: 4.2 G/DL (ref 3.5–5.2)
ALP SERPL-CCNC: 76 U/L (ref 35–104)
ALT SERPL-CCNC: 12 U/L (ref 5–33)
ANION GAP SERPL CALCULATED.3IONS-SCNC: 12 MMOL/L (ref 7–19)
AST SERPL-CCNC: 13 U/L (ref 5–32)
BASOPHILS # BLD: 0 K/UL (ref 0–0.2)
BASOPHILS NFR BLD: 0.6 % (ref 0–1)
BILIRUB SERPL-MCNC: 1.2 MG/DL (ref 0.2–1.2)
BUN SERPL-MCNC: 19 MG/DL (ref 8–23)
CALCIUM SERPL-MCNC: 9.2 MG/DL (ref 8.8–10.2)
CHLORIDE SERPL-SCNC: 99 MMOL/L (ref 98–111)
CHOLEST SERPL-MCNC: 238 MG/DL (ref 0–199)
CO2 SERPL-SCNC: 26 MMOL/L (ref 22–29)
CREAT SERPL-MCNC: 0.7 MG/DL (ref 0.5–0.9)
EOSINOPHIL # BLD: 0.1 K/UL (ref 0–0.6)
EOSINOPHIL NFR BLD: 2.8 % (ref 0–5)
ERYTHROCYTE [DISTWIDTH] IN BLOOD BY AUTOMATED COUNT: 12.6 % (ref 11.5–14.5)
GLUCOSE P FAST SERPL-MCNC: 98 MG/DL (ref 70–99)
HBA1C MFR BLD: 5.7 % (ref 4–5.6)
HCT VFR BLD AUTO: 41.2 % (ref 37–47)
HDLC SERPL-MCNC: 90 MG/DL (ref 40–60)
HGB BLD-MCNC: 13.3 G/DL (ref 12–16)
IMM GRANULOCYTES # BLD: 0 K/UL
LDLC SERPL CALC-MCNC: 117 MG/DL
LYMPHOCYTES # BLD: 1.5 K/UL (ref 1.1–4.5)
LYMPHOCYTES NFR BLD: 33.1 % (ref 20–40)
MCH RBC QN AUTO: 31.3 PG (ref 27–31)
MCHC RBC AUTO-ENTMCNC: 32.3 G/DL (ref 33–37)
MCV RBC AUTO: 96.9 FL (ref 81–99)
MONOCYTES # BLD: 0.4 K/UL (ref 0–0.9)
MONOCYTES NFR BLD: 9.1 % (ref 0–10)
NEUTROPHILS # BLD: 2.5 K/UL (ref 1.5–7.5)
NEUTS SEG NFR BLD: 54.2 % (ref 50–65)
PLATELET # BLD AUTO: 230 K/UL (ref 130–400)
PMV BLD AUTO: 10.4 FL (ref 9.4–12.3)
POTASSIUM SERPL-SCNC: 4.2 MMOL/L (ref 3.5–5)
PROT SERPL-MCNC: 6.9 G/DL (ref 6.6–8.7)
RBC # BLD AUTO: 4.25 M/UL (ref 4.2–5.4)
SODIUM SERPL-SCNC: 137 MMOL/L (ref 136–145)
T4 FREE SERPL-MCNC: 1.28 NG/DL (ref 0.93–1.7)
TRIGL SERPL-MCNC: 155 MG/DL (ref 0–149)
TSH SERPL DL<=0.005 MIU/L-ACNC: 4.74 UIU/ML (ref 0.27–4.2)
WBC # BLD AUTO: 4.6 K/UL (ref 4.8–10.8)

## 2024-08-27 SDOH — ECONOMIC STABILITY: FOOD INSECURITY: WITHIN THE PAST 12 MONTHS, YOU WORRIED THAT YOUR FOOD WOULD RUN OUT BEFORE YOU GOT MONEY TO BUY MORE.: NEVER TRUE

## 2024-08-27 SDOH — ECONOMIC STABILITY: TRANSPORTATION INSECURITY
IN THE PAST 12 MONTHS, HAS LACK OF TRANSPORTATION KEPT YOU FROM MEETINGS, WORK, OR FROM GETTING THINGS NEEDED FOR DAILY LIVING?: NO

## 2024-08-27 SDOH — ECONOMIC STABILITY: FOOD INSECURITY: WITHIN THE PAST 12 MONTHS, THE FOOD YOU BOUGHT JUST DIDN'T LAST AND YOU DIDN'T HAVE MONEY TO GET MORE.: NEVER TRUE

## 2024-08-27 SDOH — HEALTH STABILITY: PHYSICAL HEALTH
ON AVERAGE, HOW MANY DAYS PER WEEK DO YOU ENGAGE IN MODERATE TO STRENUOUS EXERCISE (LIKE A BRISK WALK)?: PATIENT DECLINED

## 2024-08-27 SDOH — ECONOMIC STABILITY: INCOME INSECURITY: HOW HARD IS IT FOR YOU TO PAY FOR THE VERY BASICS LIKE FOOD, HOUSING, MEDICAL CARE, AND HEATING?: NOT HARD AT ALL

## 2024-08-27 ASSESSMENT — LIFESTYLE VARIABLES
HOW OFTEN DO YOU HAVE A DRINK CONTAINING ALCOHOL: 4
HOW MANY STANDARD DRINKS CONTAINING ALCOHOL DO YOU HAVE ON A TYPICAL DAY: 1 OR 2
HOW OFTEN DO YOU HAVE A DRINK CONTAINING ALCOHOL: 2-3 TIMES A WEEK
HOW OFTEN DO YOU HAVE SIX OR MORE DRINKS ON ONE OCCASION: 1
HOW MANY STANDARD DRINKS CONTAINING ALCOHOL DO YOU HAVE ON A TYPICAL DAY: 1

## 2024-08-27 ASSESSMENT — PATIENT HEALTH QUESTIONNAIRE - PHQ9
2. FEELING DOWN, DEPRESSED OR HOPELESS: NOT AT ALL
SUM OF ALL RESPONSES TO PHQ QUESTIONS 1-9: 0
SUM OF ALL RESPONSES TO PHQ9 QUESTIONS 1 & 2: 0
1. LITTLE INTEREST OR PLEASURE IN DOING THINGS: NOT AT ALL
SUM OF ALL RESPONSES TO PHQ QUESTIONS 1-9: 0

## 2024-08-29 ENCOUNTER — OFFICE VISIT (OUTPATIENT)
Dept: INTERNAL MEDICINE | Age: 74
End: 2024-08-29

## 2024-08-29 VITALS
DIASTOLIC BLOOD PRESSURE: 72 MMHG | HEIGHT: 64 IN | BODY MASS INDEX: 32.33 KG/M2 | WEIGHT: 189.4 LBS | OXYGEN SATURATION: 95 % | HEART RATE: 62 BPM | SYSTOLIC BLOOD PRESSURE: 120 MMHG

## 2024-08-29 DIAGNOSIS — D18.09 HEMANGIOMA OF SPINE: ICD-10-CM

## 2024-08-29 DIAGNOSIS — E66.09 EXOGENOUS OBESITY: ICD-10-CM

## 2024-08-29 DIAGNOSIS — E78.00 PURE HYPERCHOLESTEROLEMIA: ICD-10-CM

## 2024-08-29 DIAGNOSIS — M51.9 LUMBAR DISC LESION: ICD-10-CM

## 2024-08-29 DIAGNOSIS — E03.8 SUBCLINICAL HYPOTHYROIDISM: ICD-10-CM

## 2024-08-29 DIAGNOSIS — Z00.00 MEDICARE ANNUAL WELLNESS VISIT, SUBSEQUENT: ICD-10-CM

## 2024-08-29 DIAGNOSIS — Z00.00 MEDICARE ANNUAL WELLNESS VISIT, SUBSEQUENT: Primary | ICD-10-CM

## 2024-08-29 DIAGNOSIS — R73.01 IFG (IMPAIRED FASTING GLUCOSE): ICD-10-CM

## 2024-08-29 DIAGNOSIS — Z78.0 MENOPAUSE: ICD-10-CM

## 2024-08-29 DIAGNOSIS — Z12.31 ENCOUNTER FOR SCREENING MAMMOGRAM FOR MALIGNANT NEOPLASM OF BREAST: ICD-10-CM

## 2024-08-29 DIAGNOSIS — E55.9 VITAMIN D DEFICIENCY: ICD-10-CM

## 2024-08-29 LAB
BACTERIA #/AREA URNS HPF: ABNORMAL /HPF
BILIRUB UR QL STRIP: NEGATIVE
CLARITY UR: CLEAR
COLOR UR: YELLOW
GLUCOSE UR STRIP.AUTO-MCNC: NEGATIVE MG/DL
HGB UR STRIP.AUTO-MCNC: NEGATIVE MG/L
KETONES UR STRIP.AUTO-MCNC: NEGATIVE MG/DL
LEUKOCYTE ESTERASE UR QL STRIP.AUTO: ABNORMAL
NITRITE UR QL STRIP.AUTO: NEGATIVE
PH UR STRIP.AUTO: 7 [PH] (ref 5–8)
PROT UR STRIP.AUTO-MCNC: NEGATIVE MG/DL
RBC #/AREA URNS HPF: ABNORMAL /HPF (ref 0–2)
SP GR UR STRIP.AUTO: 1.01 (ref 1–1.03)
SQUAMOUS #/AREA URNS HPF: ABNORMAL /HPF
UROBILINOGEN UR STRIP.AUTO-MCNC: 0.2 E.U./DL
WBC #/AREA URNS HPF: ABNORMAL /HPF (ref 0–5)

## 2024-08-29 SDOH — ECONOMIC STABILITY: INCOME INSECURITY: HOW HARD IS IT FOR YOU TO PAY FOR THE VERY BASICS LIKE FOOD, HOUSING, MEDICAL CARE, AND HEATING?: NOT HARD AT ALL

## 2024-08-29 SDOH — ECONOMIC STABILITY: FOOD INSECURITY: WITHIN THE PAST 12 MONTHS, THE FOOD YOU BOUGHT JUST DIDN'T LAST AND YOU DIDN'T HAVE MONEY TO GET MORE.: NEVER TRUE

## 2024-08-29 SDOH — ECONOMIC STABILITY: FOOD INSECURITY: WITHIN THE PAST 12 MONTHS, YOU WORRIED THAT YOUR FOOD WOULD RUN OUT BEFORE YOU GOT MONEY TO BUY MORE.: NEVER TRUE

## 2024-08-29 NOTE — PROGRESS NOTES
Chief Complaint   Patient presents with    Multiple medical problems     History of presenting illness:  Angelia Fleming is a74 y.o. female who presents today for follow up on her chronic medical conditions as noted below.      Patient Active Problem List    Diagnosis Date Noted    IFG (impaired fasting glucose) 01/31/2019    Pure hypercholesterolemia 01/31/2019    Gastroesophageal reflux disease without esophagitis 01/28/2019    Hiatal hernia 01/28/2019    Exogenous obesity 01/28/2019     No past medical history on file.   Past Surgical History:   Procedure Laterality Date    HYSTERECTOMY (CERVIX STATUS UNKNOWN)  2001    age 51    GRAHAM AND BSO (CERVIX REMOVED) Bilateral 2001    age 51    TONSILLECTOMY  1965     Current Outpatient Medications   Medication Sig Dispense Refill    Multiple Vitamins-Minerals (ICAPS AREDS 2 PO) Take by mouth      famotidine (PEPCID) 10 MG tablet Take 1 tablet by mouth nightly as needed       No current facility-administered medications for this visit.     No Known Allergies  Social History     Tobacco Use    Smoking status: Never    Smokeless tobacco: Never   Substance Use Topics    Alcohol use: No      Family History   Problem Relation Age of Onset    Breast Cancer Mother 47    COPD Father     Emphysema Father     Pancreatic Cancer Maternal Uncle         Age unknown       Review of Systems   Constitutional:  Negative for chills, fatigue and fever.   HENT:  Negative for congestion, ear pain, nosebleeds, postnasal drip and sore throat.    Respiratory:  Negative for cough, chest tightness and wheezing.    Cardiovascular:  Negative for chest pain, palpitations and leg swelling.   Gastrointestinal:  Negative for abdominal pain and constipation.   Genitourinary:  Negative for dysuria and urgency.   Musculoskeletal: Negative.  Negative for arthralgias.   Skin:  Negative for rash.   Neurological:  Negative for dizziness and headaches.   Psychiatric/Behavioral: Negative.       Vitals:     processed foods, simple carbohydrates and animal-based products that high in saturated fats       Vitamin D deficiency  Level 35 in 6/2022  We will obtain vitamin D level at next appointment  Take multivitamin with vitamin D daily     Subclinical hypothyroidism  TSH 4.74/ ft4 1.28  At this time suggest we observe  Repeat TSH and free T4 in 6 months  Patient will just return for lab and we will call her results    Lumbar disc lesion   abnormal MRI of lumbar spine in 2021  MPRESSION:  1. 1.5 cm sclerotic, enhancing lesion in the L3 vertebral body. An  osteoblastic metastatic lesion could have this appearance.  Differential also includes lipid poor hemangioma. This lesion was  stable in size between the CT scans from 2/13/2021 and 7/14/2021 and I  lean towards this being benign (lipid poor hemangioma) given the  appearance on T1 (only mildly hypointense). Lumbar spine CT or MRI  follow-up could be considered in 3-6 months/  2. There are several other small hemangiomas (with typical imaging  characteristics) present in the lumbar spine.  3. Moderate spinal stenosis at L3-L4 due to the prominent facet  hypertrophy.  4. Facet arthropathy with associated synovitis at L3-L4 and L4-5.    Per documentation patient was notified on 11/17/2021 of these results and neurosurgical recommendation was given to her  She stated that she does not want to schedule until speaks to her  and will be calling back in about a week to let us know how she would like to proceed  There has been no phone call regarding this from the patient  She now wishes to proceed with follow-up MRI  Orders placed  Orders Placed This Encounter   Procedures    MANGO DIGITAL SCREEN W OR WO CAD BILATERAL    MRI LUMBAR SPINE W WO CONTRAST    DEXA BONE DENSITY 2 SITES    TSH    T4, Free    Hemoglobin A1C    Comprehensive Metabolic Panel    CBC with Auto Differential    Lipid Panel    Vitamin D 25 Hydroxy    Urinalysis    TSH    T4, Free    Urinalysis with

## 2024-08-30 ASSESSMENT — ENCOUNTER SYMPTOMS
CHEST TIGHTNESS: 0
ABDOMINAL PAIN: 0
SORE THROAT: 0
CONSTIPATION: 0
COUGH: 0
WHEEZING: 0

## 2024-08-30 NOTE — PROGRESS NOTES
Call [phone] or search [employer] [place] [employer] online.  You need 1168-5873 mg of calcium and 0571-0746 IU of vitamin D per day. It is possible to meet your calcium requirement with diet alone, but a vitamin D supplement is usually necessary to meet this goal.  When exposed to the sun, use a sunscreen that protects against both UVA and UVB radiation with an SPF of 30 or greater. Reapply every 2 to 3 hours or after sweating, drying off with a towel, or swimming.  Always wear a seat belt when traveling in a car. Always wear a helmet when riding a bicycle or motorcycle.

## 2024-08-30 NOTE — PATIENT INSTRUCTIONS
Preventing Falls: Care Instructions  Injuries and health problems such as trouble walking or poor eyesight can increase your risk of falling. So can some medicines. But there are things you can do to help prevent falls. You can exercise to get stronger. You can also arrange your home to make it safer.    Talk to your doctor about the medicines you take. Ask if any of them increase the risk of falls and whether they can be changed or stopped.   Try to exercise regularly. It can help improve your strength and balance. This can help lower your risk of falling.         Practice fall safety and prevention.   Wear low-heeled shoes that fit well and give your feet good support. Talk to your doctor if you have foot problems that make this hard.  Carry a cellphone or wear a medical alert device that you can use to call for help.  Use stepladders instead of chairs to reach high objects. Don't climb if you're at risk for falls. Ask for help, if needed.  Wear the correct eyeglasses, if you need them.        Make your home safer.   Remove rugs, cords, clutter, and furniture from walkways.  Keep your house well lit. Use night-lights in hallways and bathrooms.  Install and use sturdy handrails on stairways.  Wear nonskid footwear, even inside. Don't walk barefoot or in socks without shoes.        Be safe outside.   Use handrails, curb cuts, and ramps whenever possible.  Keep your hands free by using a shoulder bag or backpack.  Try to walk in well-lit areas. Watch out for uneven ground, changes in pavement, and debris.  Be careful in the winter. Walk on the grass or gravel when sidewalks are slippery. Use de-icer on steps and walkways. Add non-slip devices to shoes.    Put grab bars and nonskid mats in your shower or tub and near the toilet. Try to use a shower chair or bath bench when bathing.   Get into a tub or shower by putting in your weaker leg first. Get out with your strong side first. Have a phone or medical alert  and having meals with you, even if they may be eating something different.  Find what works best for you. If you do not have time or do not like to cook, a program that offers meal replacement bars or shakes may be better for you. Or if you like to prepare meals, finding a plan that includes daily menus and recipes may be best.  Ask your doctor about other health professionals who can help you achieve your weight loss goals.  A dietitian can help you make healthy changes in your diet.  An exercise specialist or  can help you develop a safe and effective exercise program.  A counselor or psychiatrist can help you cope with issues such as depression, anxiety, or family problems that can make it hard to focus on weight loss.  Consider joining a support group for people who are trying to lose weight. Your doctor can suggest groups in your area.  Where can you learn more?  Go to https://www.[employer].net/patientEd and enter U357 to learn more about \"Starting a Weight Loss Plan: Care Instructions.\"  Current as of: September 20, 2023  Content Version: 14.1  © 2006-2024 [employer].   Care instructions adapted under license by [employer]. If you have questions about a medical condition or this instruction, always ask your healthcare professional. [employer] disclaims any warranty or liability for your use of this information.           A Healthy Heart: Care Instructions  Overview     Coronary artery disease, also called heart disease, occurs when a substance called plaque builds up in the vessels that supply oxygen-rich blood to your heart muscle. This can narrow the blood vessels and reduce blood flow. A heart attack happens when blood flow is completely blocked. A high-fat diet, smoking, and other factors increase the risk of heart disease.  Your doctor has found that you have a chance of having heart disease. A heart-healthy lifestyle can help keep your heart healthy and

## 2024-09-17 ENCOUNTER — OFFICE VISIT (OUTPATIENT)
Dept: INTERNAL MEDICINE | Age: 74
End: 2024-09-17
Payer: MEDICARE

## 2024-09-17 ENCOUNTER — HOSPITAL ENCOUNTER (OUTPATIENT)
Dept: GENERAL RADIOLOGY | Age: 74
Discharge: HOME | End: 2024-09-17
Payer: MEDICARE

## 2024-09-17 VITALS
SYSTOLIC BLOOD PRESSURE: 118 MMHG | HEART RATE: 76 BPM | OXYGEN SATURATION: 95 % | DIASTOLIC BLOOD PRESSURE: 72 MMHG | HEIGHT: 64 IN | WEIGHT: 189 LBS | TEMPERATURE: 99 F | BODY MASS INDEX: 32.27 KG/M2

## 2024-09-17 DIAGNOSIS — J68.3 REACTIVE AIRWAYS DYSFUNCTION SYNDROME (HCC): ICD-10-CM

## 2024-09-17 DIAGNOSIS — R06.00 DYSPNEA, UNSPECIFIED TYPE: ICD-10-CM

## 2024-09-17 DIAGNOSIS — J20.9 ACUTE BRONCHITIS AND BRONCHIOLITIS: Primary | ICD-10-CM

## 2024-09-17 DIAGNOSIS — R06.2 WHEEZING: ICD-10-CM

## 2024-09-17 DIAGNOSIS — R05.1 ACUTE COUGH: ICD-10-CM

## 2024-09-17 DIAGNOSIS — R09.89 CHEST CONGESTION: ICD-10-CM

## 2024-09-17 DIAGNOSIS — J21.9 ACUTE BRONCHITIS AND BRONCHIOLITIS: Primary | ICD-10-CM

## 2024-09-17 PROCEDURE — 1036F TOBACCO NON-USER: CPT | Performed by: INTERNAL MEDICINE

## 2024-09-17 PROCEDURE — G8417 CALC BMI ABV UP PARAM F/U: HCPCS | Performed by: INTERNAL MEDICINE

## 2024-09-17 PROCEDURE — 71046 X-RAY EXAM CHEST 2 VIEWS: CPT

## 2024-09-17 PROCEDURE — 1090F PRES/ABSN URINE INCON ASSESS: CPT | Performed by: INTERNAL MEDICINE

## 2024-09-17 PROCEDURE — G8427 DOCREV CUR MEDS BY ELIG CLIN: HCPCS | Performed by: INTERNAL MEDICINE

## 2024-09-17 PROCEDURE — 3017F COLORECTAL CA SCREEN DOC REV: CPT | Performed by: INTERNAL MEDICINE

## 2024-09-17 PROCEDURE — 1123F ACP DISCUSS/DSCN MKR DOCD: CPT | Performed by: INTERNAL MEDICINE

## 2024-09-17 PROCEDURE — G8399 PT W/DXA RESULTS DOCUMENT: HCPCS | Performed by: INTERNAL MEDICINE

## 2024-09-17 PROCEDURE — 87428 SARSCOV & INF VIR A&B AG IA: CPT | Performed by: INTERNAL MEDICINE

## 2024-09-17 RX ORDER — AMOXICILLIN AND CLAVULANATE POTASSIUM 500; 125 MG/1; MG/1
1 TABLET, FILM COATED ORAL 3 TIMES DAILY
COMMUNITY
End: 2024-09-19

## 2024-09-17 RX ORDER — BENZONATATE 100 MG/1
200 CAPSULE ORAL 3 TIMES DAILY PRN
COMMUNITY

## 2024-09-17 RX ORDER — METHYLPREDNISOLONE ACETATE 80 MG/ML
80 INJECTION, SUSPENSION INTRA-ARTICULAR; INTRALESIONAL; INTRAMUSCULAR; SOFT TISSUE ONCE
Status: COMPLETED | OUTPATIENT
Start: 2024-09-17 | End: 2024-09-17

## 2024-09-17 RX ORDER — ALBUTEROL SULFATE 90 UG/1
2 INHALANT RESPIRATORY (INHALATION) EVERY 6 HOURS PRN
Qty: 18 G | Refills: 0 | Status: SHIPPED | OUTPATIENT
Start: 2024-09-17

## 2024-09-17 RX ADMIN — METHYLPREDNISOLONE ACETATE 80 MG: 80 INJECTION, SUSPENSION INTRA-ARTICULAR; INTRALESIONAL; INTRAMUSCULAR; SOFT TISSUE at 12:11

## 2024-09-17 ASSESSMENT — ENCOUNTER SYMPTOMS
ABDOMINAL PAIN: 0
CONSTIPATION: 0
SORE THROAT: 0
CHEST TIGHTNESS: 0
COUGH: 1
WHEEZING: 1

## 2024-09-19 ENCOUNTER — OFFICE VISIT (OUTPATIENT)
Dept: INTERNAL MEDICINE | Age: 74
End: 2024-09-19

## 2024-09-19 VITALS
OXYGEN SATURATION: 94 % | WEIGHT: 189 LBS | TEMPERATURE: 99.6 F | SYSTOLIC BLOOD PRESSURE: 110 MMHG | HEART RATE: 81 BPM | HEIGHT: 64 IN | BODY MASS INDEX: 32.27 KG/M2 | DIASTOLIC BLOOD PRESSURE: 68 MMHG

## 2024-09-19 DIAGNOSIS — J20.9 ACUTE BRONCHITIS AND BRONCHIOLITIS: ICD-10-CM

## 2024-09-19 DIAGNOSIS — R06.2 WHEEZING: ICD-10-CM

## 2024-09-19 DIAGNOSIS — J21.9 ACUTE BRONCHITIS AND BRONCHIOLITIS: ICD-10-CM

## 2024-09-19 DIAGNOSIS — J18.9 PNEUMONIA OF RIGHT UPPER LOBE DUE TO INFECTIOUS ORGANISM: Primary | ICD-10-CM

## 2024-09-19 DIAGNOSIS — J68.3 REACTIVE AIRWAYS DYSFUNCTION SYNDROME (HCC): ICD-10-CM

## 2024-09-19 RX ORDER — LEVOFLOXACIN 500 MG/1
500 TABLET, FILM COATED ORAL DAILY
Qty: 8 TABLET | Refills: 0 | Status: SHIPPED | OUTPATIENT
Start: 2024-09-19 | End: 2024-09-27

## 2024-09-19 RX ORDER — METHYLPREDNISOLONE ACETATE 80 MG/ML
80 INJECTION, SUSPENSION INTRA-ARTICULAR; INTRALESIONAL; INTRAMUSCULAR; SOFT TISSUE ONCE
Status: COMPLETED | OUTPATIENT
Start: 2024-09-19 | End: 2024-09-19

## 2024-09-19 RX ADMIN — METHYLPREDNISOLONE ACETATE 80 MG: 80 INJECTION, SUSPENSION INTRA-ARTICULAR; INTRALESIONAL; INTRAMUSCULAR; SOFT TISSUE at 11:35

## 2024-09-19 ASSESSMENT — ENCOUNTER SYMPTOMS
COUGH: 1
SHORTNESS OF BREATH: 1
ABDOMINAL PAIN: 0
WHEEZING: 1
CONSTIPATION: 0
CHEST TIGHTNESS: 0
SORE THROAT: 0

## 2024-11-20 ENCOUNTER — HOSPITAL ENCOUNTER (OUTPATIENT)
Dept: MRI IMAGING | Age: 74
Discharge: HOME | End: 2024-11-20
Attending: INTERNAL MEDICINE
Payer: MEDICARE

## 2024-11-20 DIAGNOSIS — D18.09 HEMANGIOMA OF SPINE: ICD-10-CM

## 2024-11-20 DIAGNOSIS — M51.9 LUMBAR DISC LESION: ICD-10-CM

## 2024-11-20 PROCEDURE — A9577 INJ MULTIHANCE: HCPCS | Performed by: INTERNAL MEDICINE

## 2024-11-20 PROCEDURE — 6360000004 HC RX CONTRAST MEDICATION: Performed by: INTERNAL MEDICINE

## 2024-11-20 PROCEDURE — 72158 MRI LUMBAR SPINE W/O & W/DYE: CPT

## 2024-11-20 RX ADMIN — GADOBENATE DIMEGLUMINE 15 ML: 529 INJECTION, SOLUTION INTRAVENOUS at 08:27

## 2025-04-08 NOTE — PROGRESS NOTES
I did approve a one-time shorter interval between infusions.    Pseudogout is treated symptomatically (with anti-inflammatory and pain medication). Unfortunately, we do not have medications that can change pseudogout such that surgery could be avoided.     PT going to [id number]

## 2025-08-18 ENCOUNTER — HOSPITAL ENCOUNTER (OUTPATIENT)
Dept: WOMENS IMAGING | Age: 75
Discharge: HOME | End: 2025-08-18
Attending: INTERNAL MEDICINE
Payer: MEDICARE

## 2025-08-18 DIAGNOSIS — Z78.0 MENOPAUSE: ICD-10-CM

## 2025-08-18 DIAGNOSIS — Z12.31 ENCOUNTER FOR SCREENING MAMMOGRAM FOR MALIGNANT NEOPLASM OF BREAST: ICD-10-CM

## 2025-08-18 PROCEDURE — 77063 BREAST TOMOSYNTHESIS BI: CPT

## 2025-08-18 PROCEDURE — 77080 DXA BONE DENSITY AXIAL: CPT

## 2025-08-26 DIAGNOSIS — E78.00 PURE HYPERCHOLESTEROLEMIA: ICD-10-CM

## 2025-08-26 DIAGNOSIS — E03.8 SUBCLINICAL HYPOTHYROIDISM: ICD-10-CM

## 2025-08-26 DIAGNOSIS — E66.09 EXOGENOUS OBESITY: ICD-10-CM

## 2025-08-26 DIAGNOSIS — Z00.00 MEDICARE ANNUAL WELLNESS VISIT, SUBSEQUENT: ICD-10-CM

## 2025-08-26 DIAGNOSIS — M51.9 LUMBAR DISC LESION: ICD-10-CM

## 2025-08-26 DIAGNOSIS — D18.09 HEMANGIOMA OF SPINE: ICD-10-CM

## 2025-08-26 DIAGNOSIS — Z12.31 ENCOUNTER FOR SCREENING MAMMOGRAM FOR MALIGNANT NEOPLASM OF BREAST: ICD-10-CM

## 2025-08-26 DIAGNOSIS — E55.9 VITAMIN D DEFICIENCY: ICD-10-CM

## 2025-08-26 DIAGNOSIS — R73.01 IFG (IMPAIRED FASTING GLUCOSE): ICD-10-CM

## 2025-08-26 LAB
25(OH)D3 SERPL-MCNC: 52.2 NG/ML
ALBUMIN SERPL-MCNC: 4.2 G/DL (ref 3.5–5.2)
ALP SERPL-CCNC: 69 U/L (ref 35–104)
ALT SERPL-CCNC: 18 U/L (ref 10–35)
ANION GAP SERPL CALCULATED.3IONS-SCNC: 12 MMOL/L (ref 8–16)
AST SERPL-CCNC: 19 U/L (ref 10–35)
BASOPHILS # BLD: 0 K/UL (ref 0–0.2)
BASOPHILS NFR BLD: 0.8 % (ref 0–1)
BILIRUB SERPL-MCNC: 0.9 MG/DL (ref 0.2–1.2)
BILIRUB UR QL STRIP: NEGATIVE
BUN SERPL-MCNC: 19 MG/DL (ref 8–23)
CALCIUM SERPL-MCNC: 9.4 MG/DL (ref 8.8–10.2)
CHLORIDE SERPL-SCNC: 104 MMOL/L (ref 98–107)
CHOLEST SERPL-MCNC: 223 MG/DL (ref 0–199)
CLARITY UR: ABNORMAL
CO2 SERPL-SCNC: 25 MMOL/L (ref 22–29)
COLOR UR: YELLOW
CREAT SERPL-MCNC: 0.8 MG/DL (ref 0.5–0.9)
EOSINOPHIL # BLD: 0.3 K/UL (ref 0–0.6)
EOSINOPHIL NFR BLD: 4.9 % (ref 0–5)
ERYTHROCYTE [DISTWIDTH] IN BLOOD BY AUTOMATED COUNT: 13.2 % (ref 11.5–14.5)
GLUCOSE SERPL-MCNC: 95 MG/DL (ref 70–99)
GLUCOSE UR STRIP.AUTO-MCNC: NEGATIVE MG/DL
HBA1C MFR BLD: 5.7 % (ref 4–5.6)
HCT VFR BLD AUTO: 41.4 % (ref 37–47)
HDLC SERPL-MCNC: 96 MG/DL (ref 40–60)
HGB BLD-MCNC: 13.4 G/DL (ref 12–16)
HGB UR STRIP.AUTO-MCNC: NEGATIVE MG/L
IMM GRANULOCYTES # BLD: 0 K/UL
KETONES UR STRIP.AUTO-MCNC: NEGATIVE MG/DL
LDLC SERPL CALC-MCNC: 108 MG/DL
LEUKOCYTE ESTERASE UR QL STRIP.AUTO: NEGATIVE
LYMPHOCYTES # BLD: 1.5 K/UL (ref 1.1–4.5)
LYMPHOCYTES NFR BLD: 28.3 % (ref 20–40)
MCH RBC QN AUTO: 31.5 PG (ref 27–31)
MCHC RBC AUTO-ENTMCNC: 32.4 G/DL (ref 33–37)
MCV RBC AUTO: 97.4 FL (ref 81–99)
MONOCYTES # BLD: 0.5 K/UL (ref 0–0.9)
MONOCYTES NFR BLD: 8.8 % (ref 0–10)
NEUTROPHILS # BLD: 2.9 K/UL (ref 1.5–7.5)
NEUTS SEG NFR BLD: 57 % (ref 50–65)
NITRITE UR QL STRIP.AUTO: NEGATIVE
PH UR STRIP.AUTO: 7.5 [PH] (ref 5–8)
PLATELET # BLD AUTO: 237 K/UL (ref 130–400)
PMV BLD AUTO: 10.7 FL (ref 9.4–12.3)
POTASSIUM SERPL-SCNC: 4.6 MMOL/L (ref 3.5–5.1)
PROT SERPL-MCNC: 6.5 G/DL (ref 6.4–8.3)
PROT UR STRIP.AUTO-MCNC: NEGATIVE MG/DL
RBC # BLD AUTO: 4.25 M/UL (ref 4.2–5.4)
SODIUM SERPL-SCNC: 141 MMOL/L (ref 136–145)
SP GR UR STRIP.AUTO: 1.02 (ref 1–1.03)
T4 FREE SERPL-MCNC: 1.14 NG/DL (ref 0.93–1.7)
TRIGL SERPL-MCNC: 96 MG/DL (ref 0–149)
TSH SERPL DL<=0.005 MIU/L-ACNC: 3.96 UIU/ML (ref 0.27–4.2)
UROBILINOGEN UR STRIP.AUTO-MCNC: 0.2 E.U./DL
WBC # BLD AUTO: 5.1 K/UL (ref 4.8–10.8)

## 2025-08-26 SDOH — ECONOMIC STABILITY: FOOD INSECURITY: WITHIN THE PAST 12 MONTHS, YOU WORRIED THAT YOUR FOOD WOULD RUN OUT BEFORE YOU GOT MONEY TO BUY MORE.: NEVER TRUE

## 2025-08-26 SDOH — ECONOMIC STABILITY: INCOME INSECURITY: IN THE LAST 12 MONTHS, WAS THERE A TIME WHEN YOU WERE NOT ABLE TO PAY THE MORTGAGE OR RENT ON TIME?: NO

## 2025-08-26 SDOH — ECONOMIC STABILITY: FOOD INSECURITY: WITHIN THE PAST 12 MONTHS, THE FOOD YOU BOUGHT JUST DIDN'T LAST AND YOU DIDN'T HAVE MONEY TO GET MORE.: NEVER TRUE

## 2025-08-26 SDOH — HEALTH STABILITY: PHYSICAL HEALTH: ON AVERAGE, HOW MANY MINUTES DO YOU ENGAGE IN EXERCISE AT THIS LEVEL?: 40 MIN

## 2025-08-26 SDOH — ECONOMIC STABILITY: TRANSPORTATION INSECURITY
IN THE PAST 12 MONTHS, HAS THE LACK OF TRANSPORTATION KEPT YOU FROM MEDICAL APPOINTMENTS OR FROM GETTING MEDICATIONS?: NO

## 2025-08-26 SDOH — HEALTH STABILITY: PHYSICAL HEALTH: ON AVERAGE, HOW MANY DAYS PER WEEK DO YOU ENGAGE IN MODERATE TO STRENUOUS EXERCISE (LIKE A BRISK WALK)?: 5 DAYS

## 2025-08-26 ASSESSMENT — LIFESTYLE VARIABLES
HOW OFTEN DURING THE LAST YEAR HAVE YOU NEEDED AN ALCOHOLIC DRINK FIRST THING IN THE MORNING TO GET YOURSELF GOING AFTER A NIGHT OF HEAVY DRINKING: NEVER
HAS A RELATIVE, FRIEND, DOCTOR, OR ANOTHER HEALTH PROFESSIONAL EXPRESSED CONCERN ABOUT YOUR DRINKING OR SUGGESTED YOU CUT DOWN: NO
HOW OFTEN DO YOU HAVE A DRINK CONTAINING ALCOHOL: 5
HOW OFTEN DURING THE LAST YEAR HAVE YOU BEEN UNABLE TO REMEMBER WHAT HAPPENED THE NIGHT BEFORE BECAUSE YOU HAD BEEN DRINKING: NEVER
HAVE YOU OR SOMEONE ELSE BEEN INJURED AS A RESULT OF YOUR DRINKING: NO
HOW OFTEN DURING THE LAST YEAR HAVE YOU HAD A FEELING OF GUILT OR REMORSE AFTER DRINKING: NEVER
HOW OFTEN DO YOU HAVE SIX OR MORE DRINKS ON ONE OCCASION: 1
HOW OFTEN DURING THE LAST YEAR HAVE YOU HAD A FEELING OF GUILT OR REMORSE AFTER DRINKING: NEVER
HOW OFTEN DURING THE LAST YEAR HAVE YOU FAILED TO DO WHAT WAS NORMALLY EXPECTED FROM YOU BECAUSE OF DRINKING: NEVER
HOW OFTEN DURING THE LAST YEAR HAVE YOU NEEDED AN ALCOHOLIC DRINK FIRST THING IN THE MORNING TO GET YOURSELF GOING AFTER A NIGHT OF HEAVY DRINKING: NEVER
HOW OFTEN DURING THE LAST YEAR HAVE YOU FAILED TO DO WHAT WAS NORMALLY EXPECTED FROM YOU BECAUSE OF DRINKING: NEVER
HOW MANY STANDARD DRINKS CONTAINING ALCOHOL DO YOU HAVE ON A TYPICAL DAY: 1 OR 2
HAS A RELATIVE, FRIEND, DOCTOR, OR ANOTHER HEALTH PROFESSIONAL EXPRESSED CONCERN ABOUT YOUR DRINKING OR SUGGESTED YOU CUT DOWN: NO
HAVE YOU OR SOMEONE ELSE BEEN INJURED AS A RESULT OF YOUR DRINKING: NO
HOW OFTEN DURING THE LAST YEAR HAVE YOU FOUND THAT YOU WERE NOT ABLE TO STOP DRINKING ONCE YOU HAD STARTED: NEVER
HOW OFTEN DO YOU HAVE A DRINK CONTAINING ALCOHOL: 4 OR MORE TIMES A WEEK
HOW MANY STANDARD DRINKS CONTAINING ALCOHOL DO YOU HAVE ON A TYPICAL DAY: 1
HOW OFTEN DURING THE LAST YEAR HAVE YOU BEEN UNABLE TO REMEMBER WHAT HAPPENED THE NIGHT BEFORE BECAUSE YOU HAD BEEN DRINKING: NEVER
HOW OFTEN DURING THE LAST YEAR HAVE YOU FOUND THAT YOU WERE NOT ABLE TO STOP DRINKING ONCE YOU HAD STARTED: NEVER

## 2025-08-26 ASSESSMENT — PATIENT HEALTH QUESTIONNAIRE - PHQ9
SUM OF ALL RESPONSES TO PHQ QUESTIONS 1-9: 0
1. LITTLE INTEREST OR PLEASURE IN DOING THINGS: NOT AT ALL
SUM OF ALL RESPONSES TO PHQ QUESTIONS 1-9: 0
2. FEELING DOWN, DEPRESSED OR HOPELESS: NOT AT ALL
SUM OF ALL RESPONSES TO PHQ QUESTIONS 1-9: 0
SUM OF ALL RESPONSES TO PHQ QUESTIONS 1-9: 0

## 2025-08-27 ENCOUNTER — OFFICE VISIT (OUTPATIENT)
Dept: INTERNAL MEDICINE | Age: 75
End: 2025-08-27

## 2025-08-27 VITALS
HEART RATE: 62 BPM | HEIGHT: 64 IN | BODY MASS INDEX: 33.84 KG/M2 | SYSTOLIC BLOOD PRESSURE: 122 MMHG | DIASTOLIC BLOOD PRESSURE: 78 MMHG | WEIGHT: 198.2 LBS | OXYGEN SATURATION: 97 %

## 2025-08-27 DIAGNOSIS — R06.09 EXERTIONAL DYSPNEA: ICD-10-CM

## 2025-08-27 DIAGNOSIS — R07.9 CHEST PAIN, UNSPECIFIED TYPE: ICD-10-CM

## 2025-08-27 DIAGNOSIS — R06.02 SHORTNESS OF BREATH: ICD-10-CM

## 2025-08-27 DIAGNOSIS — E66.09 EXOGENOUS OBESITY: ICD-10-CM

## 2025-08-27 DIAGNOSIS — E78.00 PURE HYPERCHOLESTEROLEMIA: ICD-10-CM

## 2025-08-27 DIAGNOSIS — E55.9 VITAMIN D DEFICIENCY: ICD-10-CM

## 2025-08-27 DIAGNOSIS — Z12.31 ENCOUNTER FOR SCREENING MAMMOGRAM FOR MALIGNANT NEOPLASM OF BREAST: ICD-10-CM

## 2025-08-27 DIAGNOSIS — M25.512 CHRONIC LEFT SHOULDER PAIN: ICD-10-CM

## 2025-08-27 DIAGNOSIS — R73.03 PREDIABETES: ICD-10-CM

## 2025-08-27 DIAGNOSIS — G89.29 CHRONIC LEFT SHOULDER PAIN: ICD-10-CM

## 2025-08-27 DIAGNOSIS — R07.89 OTHER CHEST PAIN: ICD-10-CM

## 2025-08-27 DIAGNOSIS — R73.01 IFG (IMPAIRED FASTING GLUCOSE): ICD-10-CM

## 2025-08-27 DIAGNOSIS — Z00.00 MEDICARE ANNUAL WELLNESS VISIT, SUBSEQUENT: Primary | ICD-10-CM

## 2025-08-27 RX ORDER — CELECOXIB 200 MG/1
200 CAPSULE ORAL 2 TIMES DAILY
COMMUNITY
Start: 2025-07-10

## 2025-08-27 ASSESSMENT — ENCOUNTER SYMPTOMS
CHEST TIGHTNESS: 0
SHORTNESS OF BREATH: 1
SORE THROAT: 0
WHEEZING: 0
COUGH: 0
CONSTIPATION: 0
ABDOMINAL PAIN: 0

## 2025-09-05 ENCOUNTER — OFFICE VISIT (OUTPATIENT)
Age: 75
End: 2025-09-05
Payer: MEDICARE

## 2025-09-05 DIAGNOSIS — M67.912 TENDINOPATHY OF LEFT ROTATOR CUFF: ICD-10-CM

## 2025-09-05 DIAGNOSIS — G89.29 CHRONIC LEFT SHOULDER PAIN: Primary | ICD-10-CM

## 2025-09-05 DIAGNOSIS — M25.512 CHRONIC LEFT SHOULDER PAIN: Primary | ICD-10-CM

## 2025-09-05 PROCEDURE — G8427 DOCREV CUR MEDS BY ELIG CLIN: HCPCS | Performed by: PHYSICIAN ASSISTANT

## 2025-09-05 PROCEDURE — 99203 OFFICE O/P NEW LOW 30 MIN: CPT | Performed by: PHYSICIAN ASSISTANT

## 2025-09-05 PROCEDURE — 1123F ACP DISCUSS/DSCN MKR DOCD: CPT | Performed by: PHYSICIAN ASSISTANT

## 2025-09-05 PROCEDURE — 3017F COLORECTAL CA SCREEN DOC REV: CPT | Performed by: PHYSICIAN ASSISTANT

## 2025-09-05 PROCEDURE — 1159F MED LIST DOCD IN RCRD: CPT | Performed by: PHYSICIAN ASSISTANT

## 2025-09-05 PROCEDURE — G8417 CALC BMI ABV UP PARAM F/U: HCPCS | Performed by: PHYSICIAN ASSISTANT

## 2025-09-05 PROCEDURE — 1036F TOBACCO NON-USER: CPT | Performed by: PHYSICIAN ASSISTANT

## 2025-09-05 PROCEDURE — G8399 PT W/DXA RESULTS DOCUMENT: HCPCS | Performed by: PHYSICIAN ASSISTANT

## 2025-09-05 PROCEDURE — 1090F PRES/ABSN URINE INCON ASSESS: CPT | Performed by: PHYSICIAN ASSISTANT

## (undated) DEVICE — YANKAUER,BULB TIP WITH VENT: Brand: ARGYLE

## (undated) DEVICE — MASK,OXYGEN,MED CONC,ADLT,7' TUB, UC: Brand: PENDING

## (undated) DEVICE — GLW STD STR 3CM .035X150CM

## (undated) DEVICE — PK CYSTO 30

## (undated) DEVICE — SNAR POLYP SENSATION MICRO OVL 13 240X40

## (undated) DEVICE — THE CHANNEL CLEANING BRUSH IS A NYLON FLEXI BRUSH ATTACHED TO A FLEXIBLE PLASTIC SHEATH DESIGNED TO SAFELY REMOVE DEBRIS FROM FLEXIBLE ENDOSCOPES.

## (undated) DEVICE — FRCP BX RADJAW4 NDL 2.8 240 STD OG

## (undated) DEVICE — PK TURNOVER CYSTO RM

## (undated) DEVICE — TBG SMPL FLTR LINE NASL 02/C02 A/ BX/100

## (undated) DEVICE — FIBR LASR MOSES 200 DFL 2J 80HZ

## (undated) DEVICE — CUFF,BP,DISP,1 TUBE,ADULT,HP: Brand: MEDLINE

## (undated) DEVICE — DUAL LUMEN URETERAL CATHETER

## (undated) DEVICE — THE SINGLE USE ETRAP – POLYP TRAP IS USED FOR SUCTION RETRIEVAL OF ENDOSCOPICALLY REMOVED POLYPS.: Brand: ETRAP

## (undated) DEVICE — SENSR O2 OXIMAX FNGR A/ 18IN NONSTR

## (undated) DEVICE — CATH URETRL OPN/END 5F70CM

## (undated) DEVICE — URETERAL ACCESS SHEATH SET: Brand: NAVIGATOR HD

## (undated) DEVICE — Device: Brand: DEFENDO AIR/WATER/SUCTION AND BIOPSY VALVE

## (undated) DEVICE — ENDOGATOR AUXILIARY WATER JET CONNECTOR: Brand: ENDOGATOR

## (undated) DEVICE — CONMED SCOPE SAVER BITE BLOCK, 20X27 MM: Brand: SCOPE SAVER

## (undated) DEVICE — FRCP BIOP ENDO CAPTURAPRO SPK SERR 2.8MM 230CM

## (undated) DEVICE — GLV SURG BIOGEL M LTX PF 7 1/2

## (undated) DEVICE — SYS IRR PUMP SGL ACTN VAC SYR 10CC

## (undated) DEVICE — ENDOSCOPIC SEAL URO 1 SIZE FITS ALL: Brand: ENDOSCOPIC SEAL